# Patient Record
Sex: MALE | Race: WHITE | NOT HISPANIC OR LATINO | Employment: UNEMPLOYED | URBAN - METROPOLITAN AREA
[De-identification: names, ages, dates, MRNs, and addresses within clinical notes are randomized per-mention and may not be internally consistent; named-entity substitution may affect disease eponyms.]

---

## 2017-05-30 ENCOUNTER — HISTORICAL (OUTPATIENT)
Dept: ADMINISTRATIVE | Facility: HOSPITAL | Age: 32
End: 2017-05-30

## 2017-05-30 ENCOUNTER — HISTORICAL (OUTPATIENT)
Dept: RADIOLOGY | Facility: HOSPITAL | Age: 32
End: 2017-05-30

## 2017-05-30 LAB
ABS NEUT (OLG): 4.73 X10(3)/MCL (ref 2.1–9.2)
ABS NEUT (OLG): 4.77 X10(3)/MCL (ref 2.1–9.2)
ALBUMIN SERPL-MCNC: 4.4 GM/DL (ref 3.4–5)
ALBUMIN/GLOB SERPL: 1 RATIO (ref 1–2)
ALP SERPL-CCNC: 79 UNIT/L (ref 20–120)
ALT SERPL-CCNC: 28 UNIT/L
AST SERPL-CCNC: 26 UNIT/L
BASOPHILS # BLD AUTO: 0.08 X10(3)/MCL
BASOPHILS # BLD AUTO: 0.08 X10(3)/MCL
BASOPHILS NFR BLD AUTO: 1 % (ref 0–1)
BASOPHILS NFR BLD AUTO: 1 % (ref 0–1)
BILIRUB SERPL-MCNC: 0.9 MG/DL
BILIRUBIN DIRECT+TOT PNL SERPL-MCNC: <0.1 MG/DL
BILIRUBIN DIRECT+TOT PNL SERPL-MCNC: >0.8 MG/DL
BUN SERPL-MCNC: 12 MG/DL (ref 7–25)
CALCIUM SERPL-MCNC: 9.3 MG/DL (ref 8.4–10.3)
CD3+CD4+ CELLS # SPEC: 1020 UNIT/L (ref 589–1505)
CD3+CD4+ CELLS NFR BLD: 38.4 % (ref 31–59)
CHLORIDE SERPL-SCNC: 104 MMOL/L (ref 96–110)
CO2 SERPL-SCNC: 29 MMOL/L (ref 24–32)
CREAT SERPL-MCNC: 1.19 MG/DL (ref 0.7–1.4)
EOSINOPHIL # BLD AUTO: 0.24 X10(3)/MCL
EOSINOPHIL # BLD AUTO: 0.27 10*3/UL
EOSINOPHIL NFR BLD AUTO: 3 % (ref 0–5)
EOSINOPHIL NFR BLD AUTO: 3 % (ref 0–5)
ERYTHROCYTE [DISTWIDTH] IN BLOOD BY AUTOMATED COUNT: 12.6 % (ref 11.5–14.5)
ERYTHROCYTE [DISTWIDTH] IN BLOOD BY AUTOMATED COUNT: 12.7 % (ref 11.5–14.5)
GLOBULIN SER-MCNC: 3.2 GM/ML (ref 2.3–3.5)
GLUCOSE SERPL-MCNC: 116 MG/DL (ref 65–99)
HCT VFR BLD AUTO: 42.2 % (ref 40–51)
HCT VFR BLD AUTO: 42.6 % (ref 40–51)
HGB BLD-MCNC: 14.7 GM/DL (ref 13.5–17.5)
HGB BLD-MCNC: 14.8 GM/DL (ref 13.5–17.5)
IMM GRANULOCYTES # BLD AUTO: 0.02 10*3/UL
IMM GRANULOCYTES # BLD AUTO: 0.02 10*3/UL
IMM GRANULOCYTES NFR BLD AUTO: 0 %
IMM GRANULOCYTES NFR BLD AUTO: 0 %
LYMPHOCYTES # BLD AUTO: 2.59 X10(3)/MCL
LYMPHOCYTES # BLD AUTO: 2.61 X10(3)/MCL
LYMPHOCYTES # BLD AUTO: 2656 UNIT/L (ref 1260–5520)
LYMPHOCYTES NFR BLD AUTO: 31 % (ref 15–40)
LYMPHOCYTES NFR BLD AUTO: 32 % (ref 15–40)
LYMPHOCYTES NFR LN MANUAL: 32 % (ref 28–48)
LYMPHOMA - T-CELL MARKERS SPEC-IMP: NORMAL
MCH RBC QN AUTO: 31.7 PG (ref 26–34)
MCH RBC QN AUTO: 32.5 PG (ref 26–34)
MCHC RBC AUTO-ENTMCNC: 34.5 GM/DL (ref 31–37)
MCHC RBC AUTO-ENTMCNC: 35.1 GM/DL (ref 31–37)
MCV RBC AUTO: 91.8 FL (ref 80–100)
MCV RBC AUTO: 92.7 FL (ref 80–100)
MONOCYTES # BLD AUTO: 0.57 X10(3)/MCL
MONOCYTES # BLD AUTO: 0.59 X10(3)/MCL
MONOCYTES NFR BLD AUTO: 7 % (ref 4–12)
MONOCYTES NFR BLD AUTO: 7 % (ref 4–12)
NEUTROPHILS # BLD AUTO: 4.73 X10(3)/MCL
NEUTROPHILS # BLD AUTO: 4.77 X10(3)/MCL
NEUTROPHILS NFR BLD AUTO: 57 X10(3)/MCL
NEUTROPHILS NFR BLD AUTO: 57 X10(3)/MCL
PLATELET # BLD AUTO: 189 X10(3)/MCL (ref 130–400)
PLATELET # BLD AUTO: 195 X10(3)/MCL (ref 130–400)
PMV BLD AUTO: 10.7 FL (ref 7.4–10.4)
PMV BLD AUTO: 10.7 FL (ref 7.4–10.4)
POTASSIUM SERPL-SCNC: 3.5 MMOL/L (ref 3.6–5.2)
PROT SERPL-MCNC: 7.6 GM/DL (ref 6–8)
RBC # BLD AUTO: 4.55 X10(6)/MCL (ref 4.5–5.9)
RBC # BLD AUTO: 4.64 X10(6)/MCL (ref 4.5–5.9)
RPR SER QL: REACTIVE
SODIUM SERPL-SCNC: 137 MMOL/L (ref 135–146)
WBC # BLD AUTO: 8300 /MM3 (ref 4500–11500)
WBC # SPEC AUTO: 8.3 X10(3)/MCL (ref 4.5–11)
WBC # SPEC AUTO: 8.3 X10(3)/MCL (ref 4.5–11)

## 2017-06-06 ENCOUNTER — HISTORICAL (OUTPATIENT)
Dept: CARDIOLOGY | Facility: HOSPITAL | Age: 32
End: 2017-06-06

## 2017-09-15 ENCOUNTER — HISTORICAL (OUTPATIENT)
Dept: INTERNAL MEDICINE | Facility: CLINIC | Age: 32
End: 2017-09-15

## 2017-09-15 LAB
ABS NEUT (OLG): 5.09 X10(3)/MCL (ref 2.1–9.2)
ABS NEUT (OLG): 5.16 X10(3)/MCL (ref 2.1–9.2)
ALBUMIN SERPL-MCNC: 4.2 GM/DL (ref 3.4–5)
ALBUMIN/GLOB SERPL: 1 RATIO (ref 1–2)
ALP SERPL-CCNC: 73 UNIT/L (ref 45–117)
ALT SERPL-CCNC: 20 UNIT/L (ref 12–78)
APPEARANCE, UA: CLEAR
AST SERPL-CCNC: 14 UNIT/L (ref 15–37)
BACTERIA #/AREA URNS AUTO: ABNORMAL /[HPF]
BASOPHILS # BLD AUTO: 0.08 X10(3)/MCL
BASOPHILS # BLD AUTO: 0.08 X10(3)/MCL
BASOPHILS NFR BLD AUTO: 1 % (ref 0–1)
BASOPHILS NFR BLD AUTO: 1 % (ref 0–1)
BILIRUB SERPL-MCNC: 0.6 MG/DL (ref 0.2–1)
BILIRUB UR QL STRIP: NEGATIVE
BILIRUBIN DIRECT+TOT PNL SERPL-MCNC: 0.2 MG/DL
BILIRUBIN DIRECT+TOT PNL SERPL-MCNC: 0.4 MG/DL
BUN SERPL-MCNC: 6 MG/DL (ref 7–18)
CALCIUM SERPL-MCNC: 9.3 MG/DL (ref 8.5–10.1)
CD3+CD4+ CELLS # SPEC: NORMAL UNIT/L (ref 589–1505)
CD3+CD4+ CELLS NFR BLD: 36 % (ref 31–59)
CHLORIDE SERPL-SCNC: 102 MMOL/L (ref 98–107)
CHOLEST SERPL-MCNC: 145 MG/DL
CHOLEST/HDLC SERPL: 3.7 {RATIO} (ref 0–5)
CO2 SERPL-SCNC: 26 MMOL/L (ref 21–32)
COLOR UR: ABNORMAL
CREAT SERPL-MCNC: 1 MG/DL (ref 0.6–1.3)
DEPRECATED CALCIDIOL+CALCIFEROL SERPL-MC: 34.8 NG/ML (ref 30–80)
EOSINOPHIL # BLD AUTO: 0.16 10*3/UL
EOSINOPHIL # BLD AUTO: 0.18 X10(3)/MCL
EOSINOPHIL NFR BLD AUTO: 2 % (ref 0–5)
EOSINOPHIL NFR BLD AUTO: 2 % (ref 0–5)
ERYTHROCYTE [DISTWIDTH] IN BLOOD BY AUTOMATED COUNT: 11.7 % (ref 11.5–14.5)
ERYTHROCYTE [DISTWIDTH] IN BLOOD BY AUTOMATED COUNT: 11.7 % (ref 11.5–14.5)
GLOBULIN SER-MCNC: 3.7 GM/ML (ref 2.3–3.5)
GLUCOSE (UA): NORMAL
GLUCOSE SERPL-MCNC: 90 MG/DL (ref 74–106)
HCT VFR BLD AUTO: 42.6 % (ref 40–51)
HCT VFR BLD AUTO: 43 % (ref 40–51)
HDLC SERPL-MCNC: 39 MG/DL
HGB BLD-MCNC: 15.2 GM/DL (ref 13.5–17.5)
HGB BLD-MCNC: 15.2 GM/DL (ref 13.5–17.5)
HGB UR QL STRIP: NEGATIVE
HYALINE CASTS #/AREA URNS LPF: ABNORMAL /[LPF]
IMM GRANULOCYTES # BLD AUTO: 0.01 10*3/UL
IMM GRANULOCYTES # BLD AUTO: 0.03 10*3/UL
IMM GRANULOCYTES NFR BLD AUTO: 0 %
IMM GRANULOCYTES NFR BLD AUTO: 0 %
KETONES UR QL STRIP: NEGATIVE
LDLC SERPL CALC-MCNC: 55 MG/DL (ref 0–130)
LEUKOCYTE ESTERASE UR QL STRIP: NEGATIVE
LYMPHOCYTES # BLD AUTO: 2.56 X10(3)/MCL
LYMPHOCYTES # BLD AUTO: 2.58 X10(3)/MCL
LYMPHOCYTES # BLD AUTO: 2550 UNIT/L (ref 1260–5520)
LYMPHOCYTES NFR BLD AUTO: 30 % (ref 15–40)
LYMPHOCYTES NFR BLD AUTO: 30 % (ref 15–40)
LYMPHOCYTES NFR LN MANUAL: 30 % (ref 28–48)
LYMPHOMA - T-CELL MARKERS SPEC-IMP: NORMAL
MCH RBC QN AUTO: 32.9 PG (ref 26–34)
MCH RBC QN AUTO: 33.5 PG (ref 26–34)
MCHC RBC AUTO-ENTMCNC: 35.3 GM/DL (ref 31–37)
MCHC RBC AUTO-ENTMCNC: 35.7 GM/DL (ref 31–37)
MCV RBC AUTO: 93.1 FL (ref 80–100)
MCV RBC AUTO: 93.8 FL (ref 80–100)
MONOCYTES # BLD AUTO: 0.49 X10(3)/MCL
MONOCYTES # BLD AUTO: 0.54 X10(3)/MCL
MONOCYTES NFR BLD AUTO: 6 % (ref 4–12)
MONOCYTES NFR BLD AUTO: 6 % (ref 4–12)
NEUTROPHILS # BLD AUTO: 5.09 X10(3)/MCL
NEUTROPHILS # BLD AUTO: 5.16 X10(3)/MCL
NEUTROPHILS NFR BLD AUTO: 60 X10(3)/MCL
NEUTROPHILS NFR BLD AUTO: 61 X10(3)/MCL
NITRITE UR QL STRIP: NEGATIVE
PH UR STRIP: 6.5 [PH] (ref 4.5–8)
PLATELET # BLD AUTO: 228 X10(3)/MCL (ref 130–400)
PLATELET # BLD AUTO: 231 X10(3)/MCL (ref 130–400)
PMV BLD AUTO: 10.6 FL (ref 7.4–10.4)
PMV BLD AUTO: 10.8 FL (ref 7.4–10.4)
POTASSIUM SERPL-SCNC: 4.3 MMOL/L (ref 3.5–5.1)
PROT SERPL-MCNC: 7.9 GM/DL (ref 6.4–8.2)
PROT UR QL STRIP: NEGATIVE
RBC # BLD AUTO: 4.54 X10(6)/MCL (ref 4.5–5.9)
RBC # BLD AUTO: 4.62 X10(6)/MCL (ref 4.5–5.9)
RBC #/AREA URNS AUTO: ABNORMAL /[HPF]
RPR SER QL: REACTIVE
SODIUM SERPL-SCNC: 136 MMOL/L (ref 136–145)
SP GR UR STRIP: 1 (ref 1–1.03)
SQUAMOUS #/AREA URNS LPF: <1 /[LPF]
T PALLIDUM AB SER QL: REACTIVE
TRIGL SERPL-MCNC: 256 MG/DL
TSH SERPL-ACNC: 0.92 MIU/L (ref 0.36–3.74)
UROBILINOGEN UR STRIP-ACNC: NORMAL
VLDLC SERPL CALC-MCNC: 51 MG/DL
WBC # BLD AUTO: 8500 /MM3 (ref 4500–11500)
WBC # SPEC AUTO: 8.5 X10(3)/MCL (ref 4.5–11)
WBC # SPEC AUTO: 8.5 X10(3)/MCL (ref 4.5–11)
WBC #/AREA URNS AUTO: ABNORMAL /HPF

## 2017-09-25 ENCOUNTER — HOSPITAL ENCOUNTER (OUTPATIENT)
Dept: MEDSURG UNIT | Facility: HOSPITAL | Age: 32
End: 2017-09-26
Attending: INTERNAL MEDICINE | Admitting: INTERNAL MEDICINE

## 2017-09-25 LAB
ABS NEUT (OLG): 6.06 X10(3)/MCL (ref 2.1–9.2)
ALBUMIN SERPL-MCNC: 4.6 GM/DL (ref 3.4–5)
ALBUMIN/GLOB SERPL: 1.2 {RATIO}
ALP SERPL-CCNC: 86 UNIT/L (ref 50–136)
ALT SERPL-CCNC: 27 UNIT/L (ref 12–78)
AMPHET UR QL SCN: NORMAL
APPEARANCE, UA: CLEAR
AST SERPL-CCNC: 17 UNIT/L (ref 15–37)
BACTERIA SPEC CULT: NORMAL /HPF
BARBITURATE SCN PRESENT UR: NORMAL
BASOPHILS # BLD AUTO: 0 X10(3)/MCL (ref 0–0.2)
BASOPHILS NFR BLD AUTO: 0 %
BENZODIAZ UR QL SCN: NORMAL
BILIRUB SERPL-MCNC: 0.8 MG/DL (ref 0.2–1)
BILIRUB UR QL STRIP: NEGATIVE
BILIRUBIN DIRECT+TOT PNL SERPL-MCNC: 0.1 MG/DL (ref 0–0.2)
BILIRUBIN DIRECT+TOT PNL SERPL-MCNC: 0.7 MG/DL (ref 0–0.8)
BUN SERPL-MCNC: 8 MG/DL (ref 7–18)
CALCIUM SERPL-MCNC: 9.7 MG/DL (ref 8.5–10.1)
CANNABINOIDS UR QL SCN: NORMAL
CHLORIDE SERPL-SCNC: 103 MMOL/L (ref 98–107)
CO2 SERPL-SCNC: 24 MMOL/L (ref 21–32)
COCAINE UR QL SCN: NORMAL
COLOR UR: YELLOW
CREAT SERPL-MCNC: 1.1 MG/DL (ref 0.7–1.3)
EOSINOPHIL # BLD AUTO: 0.1 X10(3)/MCL (ref 0–0.9)
EOSINOPHIL NFR BLD AUTO: 1 %
ERYTHROCYTE [DISTWIDTH] IN BLOOD BY AUTOMATED COUNT: 11.9 % (ref 11.5–17)
ETHANOL SERPL-MCNC: 4 MG/DL (ref 0–3)
GLOBULIN SER-MCNC: 3.9 GM/DL (ref 2.4–3.5)
GLUCOSE (UA): NEGATIVE
GLUCOSE CSF-MCNC: 62 MG/DL (ref 40–70)
GLUCOSE SERPL-MCNC: 98 MG/DL (ref 74–106)
GRAM STN SPEC: NORMAL
HCT VFR BLD AUTO: 45.8 % (ref 42–52)
HGB BLD-MCNC: 16.2 GM/DL (ref 14–18)
HGB UR QL STRIP: NEGATIVE
KETONES UR QL STRIP: NEGATIVE
LEUKOCYTE ESTERASE UR QL STRIP: NEGATIVE
LYMPHOCYTES # BLD AUTO: 2.9 X10(3)/MCL (ref 0.6–4.6)
LYMPHOCYTES NFR BLD AUTO: 31 %
MCH RBC QN AUTO: 33.9 PG (ref 27–31)
MCHC RBC AUTO-ENTMCNC: 35.4 GM/DL (ref 33–36)
MCV RBC AUTO: 95.8 FL (ref 80–94)
MONOCYTES # BLD AUTO: 0.5 X10(3)/MCL (ref 0.1–1.3)
MONOCYTES NFR BLD AUTO: 5 %
NEUTROPHILS # BLD AUTO: 6.06 X10(3)/MCL (ref 1.4–7.9)
NEUTROPHILS NFR BLD AUTO: 63 %
NITRITE UR QL STRIP: NEGATIVE
OPIATES UR QL SCN: NORMAL
PCP UR QL: NORMAL
PH UR STRIP.AUTO: 7 [PH] (ref 5–7.5)
PH UR STRIP: 7 [PH] (ref 5–9)
PLATELET # BLD AUTO: 206 X10(3)/MCL (ref 130–400)
PMV BLD AUTO: 10.8 FL (ref 9.4–12.4)
POC TROPONIN: 0.02 NG/ML (ref 0–0.08)
POTASSIUM SERPL-SCNC: 4.6 MMOL/L (ref 3.5–5.1)
PROT CSF-MCNC: 63.5 MG/DL (ref 15–45)
PROT SERPL-MCNC: 8.5 GM/DL (ref 6.4–8.2)
PROT UR QL STRIP: NEGATIVE
RBC # BLD AUTO: 4.78 X10(6)/MCL (ref 4.7–6.1)
RBC #/AREA URNS HPF: NORMAL /[HPF]
SODIUM SERPL-SCNC: 134 MMOL/L (ref 136–145)
SP GR FLD REFRACTOMETRY: 1 (ref 1–1.03)
SP GR UR STRIP: 1 (ref 1–1.03)
SQUAMOUS EPITHELIAL, UA: NORMAL
UA WBC MAN: NORMAL
UROBILINOGEN UR STRIP-ACNC: 0.2
WBC # SPEC AUTO: 9.6 X10(3)/MCL (ref 4.5–11.5)

## 2017-09-30 LAB — FINAL CULTURE: NORMAL

## 2017-10-23 LAB — FINAL CULTURE: NORMAL

## 2018-01-08 ENCOUNTER — HISTORICAL (OUTPATIENT)
Dept: ADMINISTRATIVE | Facility: HOSPITAL | Age: 33
End: 2018-01-08

## 2018-01-08 LAB
ABS NEUT (OLG): 3.65 X10(3)/MCL (ref 2.1–9.2)
ABS NEUT (OLG): 3.7 X10(3)/MCL (ref 2.1–9.2)
ALBUMIN SERPL-MCNC: 4.6 GM/DL (ref 3.4–5)
ALBUMIN/GLOB SERPL: 1 RATIO (ref 1–2)
ALP SERPL-CCNC: 94 UNIT/L (ref 45–117)
ALT SERPL-CCNC: 32 UNIT/L (ref 12–78)
AST SERPL-CCNC: 17 UNIT/L (ref 15–37)
BASOPHILS # BLD AUTO: 0.03 X10(3)/MCL
BASOPHILS # BLD AUTO: 0.04 X10(3)/MCL
BASOPHILS NFR BLD AUTO: 0 % (ref 0–1)
BASOPHILS NFR BLD AUTO: 1 % (ref 0–1)
BILIRUB SERPL-MCNC: 0.5 MG/DL (ref 0.2–1)
BILIRUBIN DIRECT+TOT PNL SERPL-MCNC: 0.1 MG/DL
BILIRUBIN DIRECT+TOT PNL SERPL-MCNC: 0.4 MG/DL
BUN SERPL-MCNC: 9 MG/DL (ref 7–18)
CALCIUM SERPL-MCNC: 9.6 MG/DL (ref 8.5–10.1)
CD3+CD4+ CELLS # SPEC: 834 UNIT/L (ref 589–1505)
CD3+CD4+ CELLS NFR BLD: 38.3 % (ref 31–59)
CHLORIDE SERPL-SCNC: 98 MMOL/L (ref 98–107)
CHOLEST SERPL-MCNC: 153 MG/DL
CHOLEST/HDLC SERPL: 4.2 {RATIO} (ref 0–5)
CO2 SERPL-SCNC: 28 MMOL/L (ref 21–32)
CREAT SERPL-MCNC: 1 MG/DL (ref 0.6–1.3)
EOSINOPHIL # BLD AUTO: 0.14 10*3/UL
EOSINOPHIL # BLD AUTO: 0.17 X10(3)/MCL
EOSINOPHIL NFR BLD AUTO: 2 % (ref 0–5)
EOSINOPHIL NFR BLD AUTO: 3 % (ref 0–5)
ERYTHROCYTE [DISTWIDTH] IN BLOOD BY AUTOMATED COUNT: 11.9 % (ref 11.5–14.5)
ERYTHROCYTE [DISTWIDTH] IN BLOOD BY AUTOMATED COUNT: 11.9 % (ref 11.5–14.5)
GLOBULIN SER-MCNC: 4.4 GM/ML (ref 2.3–3.5)
GLUCOSE SERPL-MCNC: 96 MG/DL (ref 74–106)
HCT VFR BLD AUTO: 46.4 % (ref 40–51)
HCT VFR BLD AUTO: 47 % (ref 40–51)
HDLC SERPL-MCNC: 36 MG/DL
HGB BLD-MCNC: 16.1 GM/DL (ref 13.5–17.5)
HGB BLD-MCNC: 16.1 GM/DL (ref 13.5–17.5)
IMM GRANULOCYTES # BLD AUTO: 0.01 10*3/UL
IMM GRANULOCYTES # BLD AUTO: 0.03 10*3/UL
IMM GRANULOCYTES NFR BLD AUTO: 0 %
IMM GRANULOCYTES NFR BLD AUTO: 0 %
LDLC SERPL CALC-MCNC: 73 MG/DL (ref 0–130)
LYMPHOCYTES # BLD AUTO: 2.19 X10(3)/MCL
LYMPHOCYTES # BLD AUTO: 2.42 X10(3)/MCL
LYMPHOCYTES # BLD AUTO: 2178 UNIT/L (ref 1260–5520)
LYMPHOCYTES NFR BLD AUTO: 33 % (ref 15–40)
LYMPHOCYTES NFR BLD AUTO: 36 % (ref 15–40)
LYMPHOCYTES NFR LN MANUAL: 33 % (ref 28–48)
LYMPHOMA - T-CELL MARKERS SPEC-IMP: NORMAL
MCH RBC QN AUTO: 32.7 PG (ref 26–34)
MCH RBC QN AUTO: 33.2 PG (ref 26–34)
MCHC RBC AUTO-ENTMCNC: 34.3 GM/DL (ref 31–37)
MCHC RBC AUTO-ENTMCNC: 34.7 GM/DL (ref 31–37)
MCV RBC AUTO: 95.5 FL (ref 80–100)
MCV RBC AUTO: 95.7 FL (ref 80–100)
MONOCYTES # BLD AUTO: 0.49 X10(3)/MCL
MONOCYTES # BLD AUTO: 0.5 X10(3)/MCL
MONOCYTES NFR BLD AUTO: 7 % (ref 4–12)
MONOCYTES NFR BLD AUTO: 7 % (ref 4–12)
NEUTROPHILS # BLD AUTO: 3.65 X10(3)/MCL
NEUTROPHILS # BLD AUTO: 3.7 X10(3)/MCL
NEUTROPHILS NFR BLD AUTO: 54 X10(3)/MCL
NEUTROPHILS NFR BLD AUTO: 56 X10(3)/MCL
PLATELET # BLD AUTO: 196 X10(3)/MCL (ref 130–400)
PLATELET # BLD AUTO: 198 X10(3)/MCL (ref 130–400)
PMV BLD AUTO: 11 FL (ref 7.4–10.4)
PMV BLD AUTO: 11.1 FL (ref 7.4–10.4)
POTASSIUM SERPL-SCNC: 4.7 MMOL/L (ref 3.5–5.1)
PROT SERPL-MCNC: 9 GM/DL (ref 6.4–8.2)
RBC # BLD AUTO: 4.85 X10(6)/MCL (ref 4.5–5.9)
RBC # BLD AUTO: 4.92 X10(6)/MCL (ref 4.5–5.9)
RPR SER QL: REACTIVE
SODIUM SERPL-SCNC: 134 MMOL/L (ref 136–145)
T PALLIDUM AB SER QL: REACTIVE
TRIGL SERPL-MCNC: 218 MG/DL
VLDLC SERPL CALC-MCNC: 44 MG/DL
WBC # BLD AUTO: 6600 /MM3 (ref 4500–11500)
WBC # SPEC AUTO: 6.6 X10(3)/MCL (ref 4.5–11)
WBC # SPEC AUTO: 6.8 X10(3)/MCL (ref 4.5–11)

## 2018-04-09 ENCOUNTER — HISTORICAL (OUTPATIENT)
Dept: ADMINISTRATIVE | Facility: HOSPITAL | Age: 33
End: 2018-04-09

## 2018-04-09 LAB
ABS NEUT (OLG): 5.39 X10(3)/MCL (ref 2.1–9.2)
ALBUMIN SERPL-MCNC: 4.6 GM/DL (ref 3.4–5)
ALBUMIN/GLOB SERPL: 1 RATIO (ref 1–2)
ALP SERPL-CCNC: 99 UNIT/L (ref 45–117)
ALT SERPL-CCNC: 21 UNIT/L (ref 12–78)
AST SERPL-CCNC: 16 UNIT/L (ref 15–37)
BASOPHILS # BLD AUTO: 0.08 X10(3)/MCL
BASOPHILS NFR BLD AUTO: 1 %
BILIRUB SERPL-MCNC: 0.6 MG/DL (ref 0.2–1)
BILIRUBIN DIRECT+TOT PNL SERPL-MCNC: 0.2 MG/DL
BILIRUBIN DIRECT+TOT PNL SERPL-MCNC: 0.4 MG/DL
BUN SERPL-MCNC: 11 MG/DL (ref 7–18)
CALCIUM SERPL-MCNC: 9.7 MG/DL (ref 8.5–10.1)
CHLORIDE SERPL-SCNC: 100 MMOL/L (ref 98–107)
CO2 SERPL-SCNC: 27 MMOL/L (ref 21–32)
CREAT SERPL-MCNC: 1.2 MG/DL (ref 0.6–1.3)
EOSINOPHIL # BLD AUTO: 0.11 10*3/UL
EOSINOPHIL NFR BLD AUTO: 1 %
ERYTHROCYTE [DISTWIDTH] IN BLOOD BY AUTOMATED COUNT: 11.9 % (ref 11.5–14.5)
EST. AVERAGE GLUCOSE BLD GHB EST-MCNC: 103 MG/DL
GLOBULIN SER-MCNC: 4.5 GM/ML (ref 2.3–3.5)
GLUCOSE SERPL-MCNC: 87 MG/DL (ref 74–106)
HBA1C MFR BLD: 5.2 % (ref 4.2–6.3)
HCT VFR BLD AUTO: 50.4 % (ref 40–51)
HGB BLD-MCNC: 17.4 GM/DL (ref 13.5–17.5)
IMM GRANULOCYTES # BLD AUTO: 0.02 10*3/UL
IMM GRANULOCYTES NFR BLD AUTO: 0 %
LYMPHOCYTES # BLD AUTO: 2.07 X10(3)/MCL
LYMPHOCYTES NFR BLD AUTO: 25 % (ref 13–40)
MCH RBC QN AUTO: 32.2 PG (ref 26–34)
MCHC RBC AUTO-ENTMCNC: 34.5 GM/DL (ref 31–37)
MCV RBC AUTO: 93.3 FL (ref 80–100)
MONOCYTES # BLD AUTO: 0.52 X10(3)/MCL
MONOCYTES NFR BLD AUTO: 6 % (ref 4–12)
NEUTROPHILS # BLD AUTO: 5.39 X10(3)/MCL
NEUTROPHILS NFR BLD AUTO: 66 X10(3)/MCL
PLATELET # BLD AUTO: 267 X10(3)/MCL (ref 130–400)
PMV BLD AUTO: 11.2 FL (ref 7.4–10.4)
POTASSIUM SERPL-SCNC: 4.7 MMOL/L (ref 3.5–5.1)
PROT SERPL-MCNC: 9.1 GM/DL (ref 6.4–8.2)
RBC # BLD AUTO: 5.4 X10(6)/MCL (ref 4.5–5.9)
RPR SER QL: REACTIVE
SODIUM SERPL-SCNC: 134 MMOL/L (ref 136–145)
T PALLIDUM AB SER QL: REACTIVE
WBC # SPEC AUTO: 8.2 X10(3)/MCL (ref 4.5–11)

## 2018-05-01 ENCOUNTER — TELEPHONE (OUTPATIENT)
Dept: ENDOCRINOLOGY | Facility: CLINIC | Age: 33
End: 2018-05-01

## 2018-05-01 NOTE — TELEPHONE ENCOUNTER
----- Message from Henna Violet sent at 5/1/2018  9:18 AM CDT -----  Contact: Ezra   Tel:   [phone]   call anytime   Patient Requesting Sooner Appointment.     Reason: (I.e. Caller declined first available, appointment listed below. Caller will not accept being placed on the waitlist and is requesting a message be sent to doctor, etc.)  / transgender /hormone replacement therapy    Name of Caller: Ezra Kennedy  When is the first available appointment? No access  Symptoms: Transgender appt. Requested.  Communication Preference ([facility] response to Pt. (or) Call Back # and timeframe): [phone]   Additional Information: will need appt. Info in advance to set up transportation.

## 2018-05-01 NOTE — TELEPHONE ENCOUNTER
Spoke with patient. Has Medicaid and cannot afford the deposit that [facility] requires.  Gave information to patient for [facility] and [facility] and provided contact numbers to schedule a an appointment

## 2018-08-24 ENCOUNTER — HISTORICAL (OUTPATIENT)
Dept: ADMINISTRATIVE | Facility: HOSPITAL | Age: 33
End: 2018-08-24

## 2018-08-24 LAB
ABS NEUT (OLG): 3.32 X10(3)/MCL (ref 2.1–9.2)
ALBUMIN SERPL-MCNC: 4.2 GM/DL (ref 3.4–5)
ALBUMIN/GLOB SERPL: 1 RATIO (ref 1–2)
ALP SERPL-CCNC: 104 UNIT/L (ref 45–117)
ALT SERPL-CCNC: 21 UNIT/L (ref 12–78)
AST SERPL-CCNC: 18 UNIT/L (ref 15–37)
BASOPHILS # BLD AUTO: 0.08 X10(3)/MCL
BASOPHILS NFR BLD AUTO: 1 %
BILIRUB SERPL-MCNC: 0.6 MG/DL (ref 0.2–1)
BILIRUBIN DIRECT+TOT PNL SERPL-MCNC: 0.1 MG/DL
BILIRUBIN DIRECT+TOT PNL SERPL-MCNC: 0.5 MG/DL
BUN SERPL-MCNC: 3 MG/DL (ref 7–18)
CALCIUM SERPL-MCNC: 9.2 MG/DL (ref 8.5–10.1)
CD3+CD4+ CELLS # SPEC: 714 UNIT/L (ref 589–1505)
CD3+CD4+ CELLS NFR BLD: 32.9 % (ref 31–59)
CHLORIDE SERPL-SCNC: 104 MMOL/L (ref 98–107)
CO2 SERPL-SCNC: 32 MMOL/L (ref 21–32)
CREAT SERPL-MCNC: 1 MG/DL (ref 0.6–1.3)
EOSINOPHIL # BLD AUTO: 0.22 X10(3)/MCL
EOSINOPHIL NFR BLD AUTO: 4 %
ERYTHROCYTE [DISTWIDTH] IN BLOOD BY AUTOMATED COUNT: 11.7 % (ref 11.5–14.5)
GLOBULIN SER-MCNC: 4 GM/ML (ref 2.3–3.5)
GLUCOSE SERPL-MCNC: 94 MG/DL (ref 74–106)
HCT VFR BLD AUTO: 47.2 % (ref 40–51)
HGB BLD-MCNC: 15.4 GM/DL (ref 13.5–17.5)
IMM GRANULOCYTES # BLD AUTO: 0.01 10*3/UL
IMM GRANULOCYTES NFR BLD AUTO: 0 %
LYMPHOCYTES # BLD AUTO: 2.16 X10(3)/MCL
LYMPHOCYTES # BLD AUTO: 2170 UNIT/L (ref 1260–5520)
LYMPHOCYTES NFR BLD AUTO: 35 % (ref 13–40)
LYMPHOCYTES NFR LN MANUAL: 35 % (ref 28–48)
LYMPHOMA - T-CELL MARKERS SPEC-IMP: NORMAL
MCH RBC QN AUTO: 30.8 PG (ref 26–34)
MCHC RBC AUTO-ENTMCNC: 32.6 GM/DL (ref 31–37)
MCV RBC AUTO: 94.4 FL (ref 80–100)
MONOCYTES # BLD AUTO: 0.4 X10(3)/MCL
MONOCYTES NFR BLD AUTO: 6 % (ref 4–12)
NEUTROPHILS # BLD AUTO: 3.32 X10(3)/MCL
NEUTROPHILS NFR BLD AUTO: 54 X10(3)/MCL
PLATELET # BLD AUTO: 172 X10(3)/MCL (ref 130–400)
PMV BLD AUTO: 11.5 FL (ref 7.4–10.4)
POTASSIUM SERPL-SCNC: 4.7 MMOL/L (ref 3.5–5.1)
PROT SERPL-MCNC: 8.2 GM/DL (ref 6.4–8.2)
RBC # BLD AUTO: 5 X10(6)/MCL (ref 4.5–5.9)
RPR SER QL: REACTIVE
SODIUM SERPL-SCNC: 140 MMOL/L (ref 136–145)
T PALLIDUM AB SER QL: REACTIVE
WBC # BLD AUTO: 6200 /MM3 (ref 4500–11500)
WBC # SPEC AUTO: 6.2 X10(3)/MCL (ref 4.5–11)

## 2018-12-17 ENCOUNTER — HISTORICAL (OUTPATIENT)
Dept: ADMINISTRATIVE | Facility: HOSPITAL | Age: 33
End: 2018-12-17

## 2018-12-17 LAB
APPEARANCE, UA: CLEAR
BACTERIA #/AREA URNS AUTO: ABNORMAL /[HPF]
BILIRUB UR QL STRIP: NEGATIVE
COLOR UR: YELLOW
GLUCOSE (UA): NORMAL
HGB UR QL STRIP: NEGATIVE
HYALINE CASTS #/AREA URNS LPF: ABNORMAL /[LPF]
KETONES UR QL STRIP: NEGATIVE
LEUKOCYTE ESTERASE UR QL STRIP: NEGATIVE
NITRITE UR QL STRIP: NEGATIVE
PH UR STRIP: 7 [PH] (ref 4.5–8)
PROT UR QL STRIP: NEGATIVE
RBC #/AREA URNS AUTO: ABNORMAL /[HPF]
SP GR UR STRIP: 1 (ref 1–1.03)
SQUAMOUS #/AREA URNS LPF: ABNORMAL /[LPF]
UROBILINOGEN UR STRIP-ACNC: NORMAL
WBC #/AREA URNS AUTO: ABNORMAL /HPF

## 2019-04-22 ENCOUNTER — HISTORICAL (OUTPATIENT)
Dept: ADMINISTRATIVE | Facility: HOSPITAL | Age: 34
End: 2019-04-22

## 2019-04-22 LAB
ABS NEUT (OLG): 4.26 X10(3)/MCL (ref 2.1–9.2)
ALBUMIN SERPL-MCNC: 4.4 GM/DL (ref 3.4–5)
ALBUMIN/GLOB SERPL: 1.2 RATIO (ref 1.1–2)
ALP SERPL-CCNC: 87 UNIT/L (ref 45–117)
ALT SERPL-CCNC: 27 UNIT/L (ref 12–78)
APPEARANCE, UA: CLEAR
AST SERPL-CCNC: 19 UNIT/L (ref 15–37)
BACTERIA #/AREA URNS AUTO: ABNORMAL /[HPF]
BASOPHILS # BLD AUTO: 0.07 X10(3)/MCL
BASOPHILS NFR BLD AUTO: 1 %
BILIRUB SERPL-MCNC: 1.2 MG/DL (ref 0.2–1)
BILIRUB UR QL STRIP: NEGATIVE
BILIRUBIN DIRECT+TOT PNL SERPL-MCNC: 0.2 MG/DL
BILIRUBIN DIRECT+TOT PNL SERPL-MCNC: 1 MG/DL
BUN SERPL-MCNC: 6 MG/DL (ref 7–18)
CALCIUM SERPL-MCNC: 9.2 MG/DL (ref 8.5–10.1)
CD3+CD4+ CELLS # SPEC: 647 UNIT/L (ref 589–1505)
CD3+CD4+ CELLS NFR BLD: 34 % (ref 31–59)
CHLORIDE SERPL-SCNC: 103 MMOL/L (ref 98–107)
CHOLEST SERPL-MCNC: 144 MG/DL
CHOLEST/HDLC SERPL: 4.8 {RATIO} (ref 0–5)
CO2 SERPL-SCNC: 32 MMOL/L (ref 21–32)
COLOR UR: NORMAL
CREAT SERPL-MCNC: 1.1 MG/DL (ref 0.6–1.3)
DEPRECATED CALCIDIOL+CALCIFEROL SERPL-MC: 22.46 NG/ML (ref 30–80)
EOSINOPHIL # BLD AUTO: 0.15 X10(3)/MCL
EOSINOPHIL NFR BLD AUTO: 2 %
ERYTHROCYTE [DISTWIDTH] IN BLOOD BY AUTOMATED COUNT: 11.9 % (ref 11.5–14.5)
EST. AVERAGE GLUCOSE BLD GHB EST-MCNC: 100 MG/DL
GLOBULIN SER-MCNC: 3.8 GM/ML (ref 2.3–3.5)
GLUCOSE (UA): NORMAL
GLUCOSE SERPL-MCNC: 95 MG/DL (ref 74–106)
HBA1C MFR BLD: 5.1 % (ref 4.2–6.3)
HCT VFR BLD AUTO: 48.2 % (ref 40–51)
HCV AB SERPL QL IA: NONREACTIVE
HDLC SERPL-MCNC: 30 MG/DL
HGB BLD-MCNC: 16.2 GM/DL (ref 13.5–17.5)
HGB UR QL STRIP: NEGATIVE
HYALINE CASTS #/AREA URNS LPF: ABNORMAL /[LPF]
IMM GRANULOCYTES # BLD AUTO: 0.01 10*3/UL
IMM GRANULOCYTES NFR BLD AUTO: 0 %
KETONES UR QL STRIP: NEGATIVE
LDLC SERPL CALC-MCNC: 58 MG/DL (ref 0–130)
LEUKOCYTE ESTERASE UR QL STRIP: NEGATIVE
LYMPHOCYTES # BLD AUTO: 1.87 X10(3)/MCL
LYMPHOCYTES # BLD AUTO: 1904 UNIT/L (ref 1260–5520)
LYMPHOCYTES NFR BLD AUTO: 28 % (ref 13–40)
LYMPHOCYTES NFR LN MANUAL: 28 % (ref 28–48)
LYMPHOMA - T-CELL MARKERS SPEC-IMP: NORMAL
MCH RBC QN AUTO: 31.2 PG (ref 26–34)
MCHC RBC AUTO-ENTMCNC: 33.6 GM/DL (ref 31–37)
MCV RBC AUTO: 92.7 FL (ref 80–100)
MONOCYTES # BLD AUTO: 0.44 X10(3)/MCL
MONOCYTES NFR BLD AUTO: 6 % (ref 4–12)
NEG CONT SPOT COUNT: NORMAL
NEUTROPHILS # BLD AUTO: 4.26 X10(3)/MCL
NEUTROPHILS NFR BLD AUTO: 63 X10(3)/MCL
NITRITE UR QL STRIP: NEGATIVE
PANEL A SPOT COUNT: 0
PANEL B SPOT COUNT: 0
PH UR STRIP: 8 [PH] (ref 4.5–8)
PLATELET # BLD AUTO: 211 X10(3)/MCL (ref 130–400)
PMV BLD AUTO: 10.2 FL (ref 7.4–10.4)
POS CONT SPOT COUNT: NORMAL
POTASSIUM SERPL-SCNC: 4.5 MMOL/L (ref 3.5–5.1)
PROT SERPL-MCNC: 8.2 GM/DL (ref 6.4–8.2)
PROT UR QL STRIP: NEGATIVE
RBC # BLD AUTO: 5.2 X10(6)/MCL (ref 4.5–5.9)
RBC #/AREA URNS AUTO: ABNORMAL /[HPF]
RPR SER QL: REACTIVE
SODIUM SERPL-SCNC: 137 MMOL/L (ref 136–145)
SP GR UR STRIP: 1 (ref 1–1.03)
SQUAMOUS #/AREA URNS LPF: ABNORMAL /[LPF]
T PALLIDUM AB SER QL: REACTIVE
T-SPOT.TB: NORMAL
TRIGL SERPL-MCNC: 279 MG/DL
TSH SERPL-ACNC: 1.29 MIU/L (ref 0.36–3.74)
UROBILINOGEN UR STRIP-ACNC: NORMAL
VLDLC SERPL CALC-MCNC: 56 MG/DL
WBC # BLD AUTO: 6800 /MM3 (ref 4500–11500)
WBC # SPEC AUTO: 6.8 X10(3)/MCL (ref 4.5–11)
WBC #/AREA URNS AUTO: ABNORMAL /HPF

## 2019-07-19 ENCOUNTER — HISTORICAL (OUTPATIENT)
Dept: ADMINISTRATIVE | Facility: HOSPITAL | Age: 34
End: 2019-07-19

## 2019-07-19 LAB
ABS NEUT (OLG): 4.67 X10(3)/MCL (ref 2.1–9.2)
ABS NEUT (OLG): 4.69 X10(3)/MCL (ref 2.1–9.2)
ALBUMIN SERPL-MCNC: 4.2 GM/DL (ref 3.4–5)
ALBUMIN/GLOB SERPL: 1.1 RATIO (ref 1.1–2)
ALP SERPL-CCNC: 107 UNIT/L (ref 45–117)
ALT SERPL-CCNC: 29 UNIT/L (ref 12–78)
AMPHET UR QL SCN: NEGATIVE
AST SERPL-CCNC: 19 UNIT/L (ref 15–37)
BARBITURATE SCN PRESENT UR: NEGATIVE
BASOPHILS # BLD AUTO: 0.07 X10(3)/MCL
BASOPHILS # BLD AUTO: 0.08 X10(3)/MCL
BASOPHILS NFR BLD AUTO: 1 %
BASOPHILS NFR BLD AUTO: 1 %
BENZODIAZ UR QL SCN: NEGATIVE
BILIRUB SERPL-MCNC: 0.8 MG/DL (ref 0.2–1)
BILIRUBIN DIRECT+TOT PNL SERPL-MCNC: 0.2 MG/DL
BILIRUBIN DIRECT+TOT PNL SERPL-MCNC: 0.6 MG/DL
BUN SERPL-MCNC: 5 MG/DL (ref 7–18)
CALCIUM SERPL-MCNC: 9.6 MG/DL (ref 8.5–10.1)
CANNABINOIDS UR QL SCN: POSITIVE
CD3+CD4+ CELLS # SPEC: 545 UNIT/L (ref 589–1505)
CD3+CD4+ CELLS NFR BLD: 26.3 % (ref 31–59)
CHLORIDE SERPL-SCNC: 105 MMOL/L (ref 98–107)
CHOLEST SERPL-MCNC: 140 MG/DL
CHOLEST/HDLC SERPL: 3.9 {RATIO} (ref 0–5)
CO2 SERPL-SCNC: 32 MMOL/L (ref 21–32)
COCAINE UR QL SCN: NEGATIVE
CREAT SERPL-MCNC: 1.1 MG/DL (ref 0.6–1.3)
DEPRECATED CALCIDIOL+CALCIFEROL SERPL-MC: 22.45 NG/ML (ref 30–80)
EOSINOPHIL # BLD AUTO: 0.17 10*3/UL
EOSINOPHIL # BLD AUTO: 0.2 X10(3)/MCL
EOSINOPHIL NFR BLD AUTO: 2 %
EOSINOPHIL NFR BLD AUTO: 3 %
ERYTHROCYTE [DISTWIDTH] IN BLOOD BY AUTOMATED COUNT: 12.4 % (ref 11.5–14.5)
ERYTHROCYTE [DISTWIDTH] IN BLOOD BY AUTOMATED COUNT: 12.6 % (ref 11.5–14.5)
EST. AVERAGE GLUCOSE BLD GHB EST-MCNC: 108 MG/DL
GLOBULIN SER-MCNC: 3.9 GM/ML (ref 2.3–3.5)
GLUCOSE SERPL-MCNC: 97 MG/DL (ref 74–106)
HBA1C MFR BLD: 5.4 % (ref 4.2–6.3)
HCT VFR BLD AUTO: 49 % (ref 40–51)
HCT VFR BLD AUTO: 49.9 % (ref 40–51)
HCV AB SERPL QL IA: NONREACTIVE
HDLC SERPL-MCNC: 36 MG/DL
HGB BLD-MCNC: 16.5 GM/DL (ref 13.5–17.5)
HGB BLD-MCNC: 16.5 GM/DL (ref 13.5–17.5)
IMM GRANULOCYTES # BLD AUTO: 0.01 10*3/UL
IMM GRANULOCYTES # BLD AUTO: 0.01 10*3/UL
IMM GRANULOCYTES NFR BLD AUTO: 0 %
IMM GRANULOCYTES NFR BLD AUTO: 0 %
LDLC SERPL CALC-MCNC: 58 MG/DL (ref 0–130)
LYMPHOCYTES # BLD AUTO: 2.09 X10(3)/MCL
LYMPHOCYTES # BLD AUTO: 2.11 X10(3)/MCL
LYMPHOCYTES # BLD AUTO: 2072 UNIT/L (ref 1260–5520)
LYMPHOCYTES NFR BLD AUTO: 28 % (ref 13–40)
LYMPHOCYTES NFR BLD AUTO: 28 % (ref 13–40)
LYMPHOCYTES NFR LN MANUAL: 28 % (ref 28–48)
LYMPHOMA - T-CELL MARKERS SPEC-IMP: ABNORMAL
MCH RBC QN AUTO: 30.8 PG (ref 26–34)
MCH RBC QN AUTO: 30.8 PG (ref 26–34)
MCHC RBC AUTO-ENTMCNC: 33.1 GM/DL (ref 31–37)
MCHC RBC AUTO-ENTMCNC: 33.7 GM/DL (ref 31–37)
MCV RBC AUTO: 91.4 FL (ref 80–100)
MCV RBC AUTO: 93.1 FL (ref 80–100)
MONOCYTES # BLD AUTO: 0.4 X10(3)/MCL
MONOCYTES # BLD AUTO: 0.41 X10(3)/MCL
MONOCYTES NFR BLD AUTO: 5 % (ref 0–24)
MONOCYTES NFR BLD AUTO: 6 % (ref 0–24)
NEG CONT SPOT COUNT: NORMAL
NEUTROPHILS # BLD AUTO: 4.67 X10(3)/MCL
NEUTROPHILS # BLD AUTO: 4.69 X10(3)/MCL
NEUTROPHILS NFR BLD AUTO: 63 X10(3)/MCL
NEUTROPHILS NFR BLD AUTO: 63 X10(3)/MCL
OPIATES UR QL SCN: NEGATIVE
PANEL A SPOT COUNT: 0
PANEL B SPOT COUNT: 0
PCP UR QL: NEGATIVE
PH UR STRIP.AUTO: 7 [PH] (ref 5–8)
PLATELET # BLD AUTO: 207 X10(3)/MCL (ref 130–400)
PLATELET # BLD AUTO: 212 X10(3)/MCL (ref 130–400)
PMV BLD AUTO: 10.5 FL (ref 7.4–10.4)
PMV BLD AUTO: 10.7 FL (ref 7.4–10.4)
POS CONT SPOT COUNT: NORMAL
POTASSIUM SERPL-SCNC: 4.6 MMOL/L (ref 3.5–5.1)
PROT SERPL-MCNC: 8.1 GM/DL (ref 6.4–8.2)
RBC # BLD AUTO: 5.36 X10(6)/MCL (ref 4.5–5.9)
RBC # BLD AUTO: 5.36 X10(6)/MCL (ref 4.5–5.9)
RPR SER QL: REACTIVE
SODIUM SERPL-SCNC: 138 MMOL/L (ref 136–145)
T PALLIDUM AB SER QL: REACTIVE
T-SPOT.TB: NORMAL
TEMPERATURE, URINE (OHS): 23 DEGC (ref 20–25)
TRIGL SERPL-MCNC: 229 MG/DL
TSH SERPL-ACNC: 0.76 MIU/L (ref 0.36–3.74)
VLDLC SERPL CALC-MCNC: 46 MG/DL
WBC # BLD AUTO: 7400 /MM3 (ref 4500–11500)
WBC # SPEC AUTO: 7.4 X10(3)/MCL (ref 4.5–11)
WBC # SPEC AUTO: 7.5 X10(3)/MCL (ref 4.5–11)

## 2019-07-24 ENCOUNTER — HISTORICAL (OUTPATIENT)
Dept: ADMINISTRATIVE | Facility: HOSPITAL | Age: 34
End: 2019-07-24

## 2019-07-24 LAB
CRP SERPL-MCNC: <0.3 MG/DL
ERYTHROCYTE [SEDIMENTATION RATE] IN BLOOD: 2 MM/HR (ref 0–15)
URATE SERPL-MCNC: 5.3 MG/DL (ref 3.5–7.2)

## 2020-01-24 ENCOUNTER — HISTORICAL (OUTPATIENT)
Dept: ADMINISTRATIVE | Facility: HOSPITAL | Age: 35
End: 2020-01-24

## 2020-01-24 LAB
HAV IGM SERPL QL IA: NORMAL
HBV CORE IGM SERPL QL IA: NORMAL
HBV SURFACE AG SERPL QL IA: NORMAL
HCV AB SERPL QL IA: NORMAL
RPR SER QL: REACTIVE
T PALLIDUM AB SER QL: REACTIVE

## 2020-02-02 ENCOUNTER — HISTORICAL (OUTPATIENT)
Dept: ADMINISTRATIVE | Facility: HOSPITAL | Age: 35
End: 2020-02-02

## 2020-02-20 LAB
BILIRUB SERPL-MCNC: NEGATIVE MG/DL
BLOOD URINE, POC: NEGATIVE
CLARITY, POC UA: CLEAR
COLOR, POC UA: NORMAL
GLUCOSE UR QL STRIP: NEGATIVE
KETONES UR QL STRIP: NEGATIVE
LEUKOCYTE EST, POC UA: NEGATIVE
NITRITE, POC UA: NEGATIVE
PH, POC UA: 7
PROTEIN, POC: NEGATIVE
SPECIFIC GRAVITY, POC UA: 1.01
UROBILINOGEN, POC UA: NORMAL

## 2020-03-12 ENCOUNTER — HISTORICAL (OUTPATIENT)
Dept: ADMINISTRATIVE | Facility: HOSPITAL | Age: 35
End: 2020-03-12

## 2020-03-19 ENCOUNTER — HISTORICAL (OUTPATIENT)
Dept: ADMINISTRATIVE | Facility: HOSPITAL | Age: 35
End: 2020-03-19

## 2020-03-19 LAB
FLUAV AG UPPER RESP QL IA.RAPID: NEGATIVE
FLUBV AG UPPER RESP QL IA.RAPID: NEGATIVE

## 2020-03-21 LAB — FINAL CULTURE: NORMAL

## 2020-04-03 ENCOUNTER — HISTORICAL (OUTPATIENT)
Dept: ADMINISTRATIVE | Facility: HOSPITAL | Age: 35
End: 2020-04-03

## 2020-04-03 LAB
ABS NEUT (OLG): 3.03 X10(3)/MCL (ref 2.1–9.2)
ALBUMIN SERPL-MCNC: 4 GM/DL (ref 3.4–5)
ALBUMIN/GLOB SERPL: 1.1 RATIO (ref 1.1–2)
ALP SERPL-CCNC: 94 UNIT/L (ref 45–117)
ALT SERPL-CCNC: 23 UNIT/L (ref 12–78)
AST SERPL-CCNC: 15 UNIT/L (ref 15–37)
BASOPHILS # BLD AUTO: 0.1 X10(3)/MCL (ref 0–0.2)
BASOPHILS NFR BLD AUTO: 1 %
BILIRUB SERPL-MCNC: 0.6 MG/DL (ref 0.2–1)
BILIRUBIN DIRECT+TOT PNL SERPL-MCNC: 0.1 MG/DL (ref 0–0.2)
BILIRUBIN DIRECT+TOT PNL SERPL-MCNC: 0.5 MG/DL
BUN SERPL-MCNC: 6 MG/DL (ref 7–18)
CALCIUM SERPL-MCNC: 8.9 MG/DL (ref 8.5–10.1)
CD3+CD4+ CELLS # SPEC: 1020 UNIT/L (ref 589–1505)
CD3+CD4+ CELLS NFR BLD: 42.2 % (ref 31–59)
CHLORIDE SERPL-SCNC: 102 MMOL/L (ref 98–107)
CO2 SERPL-SCNC: 29 MMOL/L (ref 21–32)
CREAT SERPL-MCNC: 1.2 MG/DL (ref 0.6–1.3)
EOSINOPHIL # BLD AUTO: 0.2 X10(3)/MCL (ref 0–0.9)
EOSINOPHIL NFR BLD AUTO: 3 %
ERYTHROCYTE [DISTWIDTH] IN BLOOD BY AUTOMATED COUNT: 13.7 % (ref 11.5–14.5)
GLOBULIN SER-MCNC: 3.6 GM/ML (ref 2.3–3.5)
GLUCOSE SERPL-MCNC: 98 MG/DL (ref 74–106)
HCT VFR BLD AUTO: 43.3 % (ref 40–51)
HGB BLD-MCNC: 14.6 GM/DL (ref 13.5–17.5)
IMM GRANULOCYTES # BLD AUTO: 0.01 10*3/UL
IMM GRANULOCYTES NFR BLD AUTO: 0 %
LYMPHOCYTES # BLD AUTO: 2.4 X10(3)/MCL (ref 0.6–4.6)
LYMPHOCYTES # BLD AUTO: 2418 UNIT/L (ref 1260–5520)
LYMPHOCYTES NFR BLD AUTO: 39 %
LYMPHOCYTES NFR LN MANUAL: 39 % (ref 28–48)
LYMPHOMA - T-CELL MARKERS SPEC-IMP: NORMAL
MCH RBC QN AUTO: 31.5 PG (ref 26–34)
MCHC RBC AUTO-ENTMCNC: 33.7 GM/DL (ref 31–37)
MCV RBC AUTO: 93.5 FL (ref 80–100)
MONOCYTES # BLD AUTO: 0.5 X10(3)/MCL (ref 0.1–1.3)
MONOCYTES NFR BLD AUTO: 8 %
NEUTROPHILS # BLD AUTO: 3.03 X10(3)/MCL (ref 2.1–9.2)
NEUTROPHILS NFR BLD AUTO: 49 %
PLATELET # BLD AUTO: 241 X10(3)/MCL (ref 130–400)
PMV BLD AUTO: 9.7 FL (ref 7.4–10.4)
POTASSIUM SERPL-SCNC: 4.2 MMOL/L (ref 3.5–5.1)
PROT SERPL-MCNC: 7.6 GM/DL (ref 6.4–8.2)
RBC # BLD AUTO: 4.63 X10(6)/MCL (ref 4.5–5.9)
SODIUM SERPL-SCNC: 136 MMOL/L (ref 136–145)
WBC # BLD AUTO: 6200 /MM3 (ref 4500–11500)
WBC # SPEC AUTO: 6.2 X10(3)/MCL (ref 4.5–11)

## 2020-06-19 ENCOUNTER — HISTORICAL (OUTPATIENT)
Dept: INTERNAL MEDICINE | Facility: CLINIC | Age: 35
End: 2020-06-19

## 2020-06-19 ENCOUNTER — HISTORICAL (OUTPATIENT)
Dept: ADMINISTRATIVE | Facility: HOSPITAL | Age: 35
End: 2020-06-19

## 2020-06-19 LAB
ABS NEUT (OLG): 4.78 X10(3)/MCL (ref 2.1–9.2)
ALBUMIN SERPL-MCNC: 4.2 GM/DL (ref 3.4–5)
ALBUMIN/GLOB SERPL: 1.2 RATIO (ref 1.1–2)
ALP SERPL-CCNC: 67 UNIT/L (ref 45–117)
ALT SERPL-CCNC: 18 UNIT/L (ref 12–78)
APPEARANCE, UA: CLEAR
AST SERPL-CCNC: 10 UNIT/L (ref 15–37)
BACTERIA #/AREA URNS AUTO: ABNORMAL /HPF
BASOPHILS # BLD AUTO: 0.1 X10(3)/MCL (ref 0–0.2)
BASOPHILS NFR BLD AUTO: 1 %
BILIRUB SERPL-MCNC: 0.6 MG/DL (ref 0.2–1)
BILIRUB UR QL STRIP: NEGATIVE
BILIRUBIN DIRECT+TOT PNL SERPL-MCNC: 0.2 MG/DL (ref 0–0.2)
BILIRUBIN DIRECT+TOT PNL SERPL-MCNC: 0.4 MG/DL
BUN SERPL-MCNC: 9 MG/DL (ref 7–18)
CALCIUM SERPL-MCNC: 8.8 MG/DL (ref 8.5–10.1)
CD3+CD4+ CELLS # SPEC: 1048 UNIT/L (ref 589–1505)
CD3+CD4+ CELLS NFR BLD: 37.8 % (ref 31–59)
CHLORIDE SERPL-SCNC: 105 MMOL/L (ref 98–107)
CHOLEST SERPL-MCNC: 137 MG/DL
CHOLEST/HDLC SERPL: 4.9 {RATIO} (ref 0–5)
CO2 SERPL-SCNC: 30 MMOL/L (ref 21–32)
COLOR UR: YELLOW
CREAT SERPL-MCNC: 1.2 MG/DL (ref 0.6–1.3)
DEPRECATED CALCIDIOL+CALCIFEROL SERPL-MC: 27.7 NG/ML (ref 30–80)
EOSINOPHIL # BLD AUTO: 0.2 X10(3)/MCL (ref 0–0.9)
EOSINOPHIL NFR BLD AUTO: 2 %
ERYTHROCYTE [DISTWIDTH] IN BLOOD BY AUTOMATED COUNT: 12 % (ref 11.5–14.5)
EST. AVERAGE GLUCOSE BLD GHB EST-MCNC: 114 MG/DL
GLOBULIN SER-MCNC: 3.4 GM/ML (ref 2.3–3.5)
GLUCOSE (UA): NEGATIVE
GLUCOSE SERPL-MCNC: 92 MG/DL (ref 74–106)
H PYLORI AB SER IA-ACNC: NEGATIVE
HAV AB SER QL IA: REACTIVE
HBA1C MFR BLD: 5.6 % (ref 4.2–6.3)
HBV SURFACE AB SER-ACNC: >1000 M[IU]/ML
HBV SURFACE AB SERPL IA-ACNC: REACTIVE M[IU]/ML
HCT VFR BLD AUTO: 43.7 % (ref 40–51)
HCV AB SERPL QL IA: NONREACTIVE
HDLC SERPL-MCNC: 28 MG/DL (ref 40–59)
HGB BLD-MCNC: 14.7 GM/DL (ref 13.5–17.5)
HGB UR QL STRIP: NEGATIVE
HYALINE CASTS #/AREA URNS LPF: ABNORMAL /LPF
IMM GRANULOCYTES # BLD AUTO: 0.01 10*3/UL
IMM GRANULOCYTES NFR BLD AUTO: 0 %
KETONES UR QL STRIP: NEGATIVE
LDLC SERPL CALC-MCNC: 60 MG/DL
LEUKOCYTE ESTERASE UR QL STRIP: NEGATIVE
LYMPHOCYTES # BLD AUTO: 2.8 X10(3)/MCL (ref 0.6–4.6)
LYMPHOCYTES # BLD AUTO: 2772 UNIT/L (ref 1260–5520)
LYMPHOCYTES NFR BLD AUTO: 33 %
LYMPHOCYTES NFR LN MANUAL: 33 % (ref 28–48)
LYMPHOMA - T-CELL MARKERS SPEC-IMP: NORMAL
MCH RBC QN AUTO: 32.5 PG (ref 26–34)
MCHC RBC AUTO-ENTMCNC: 33.6 GM/DL (ref 31–37)
MCV RBC AUTO: 96.7 FL (ref 80–100)
MONOCYTES # BLD AUTO: 0.5 X10(3)/MCL (ref 0.1–1.3)
MONOCYTES NFR BLD AUTO: 6 %
NEG CONT SPOT COUNT: NORMAL
NEUTROPHILS # BLD AUTO: 4.78 X10(3)/MCL (ref 2.1–9.2)
NEUTROPHILS NFR BLD AUTO: 57 %
NITRITE UR QL STRIP: NEGATIVE
PANEL A SPOT COUNT: 0
PANEL B SPOT COUNT: 0
PH UR STRIP: 7 [PH] (ref 4.5–8)
PLATELET # BLD AUTO: 192 X10(3)/MCL (ref 130–400)
PMV BLD AUTO: 9.8 FL (ref 7.4–10.4)
POS CONT SPOT COUNT: NORMAL
POTASSIUM SERPL-SCNC: 4.3 MMOL/L (ref 3.5–5.1)
PROT SERPL-MCNC: 7.6 GM/DL (ref 6.4–8.2)
PROT UR QL STRIP: NEGATIVE
RBC # BLD AUTO: 4.52 X10(6)/MCL (ref 4.5–5.9)
RBC #/AREA URNS AUTO: ABNORMAL /HPF
RPR SER QL: REACTIVE
SODIUM SERPL-SCNC: 138 MMOL/L (ref 136–145)
SP GR UR STRIP: 1.01 (ref 1–1.03)
SQUAMOUS #/AREA URNS LPF: ABNORMAL /LPF
T PALLIDUM AB SER QL: REACTIVE
T-SPOT.TB: NORMAL
TRIGL SERPL-MCNC: 243 MG/DL
TSH SERPL-ACNC: 0.56 MIU/L (ref 0.36–3.74)
UROBILINOGEN UR STRIP-ACNC: 2 MG/DL
VLDLC SERPL CALC-MCNC: 49 MG/DL
WBC # BLD AUTO: 8400 /MM3 (ref 4500–11500)
WBC # SPEC AUTO: 8.4 X10(3)/MCL (ref 4.5–11)
WBC #/AREA URNS AUTO: ABNORMAL /HPF

## 2020-10-01 ENCOUNTER — HISTORICAL (OUTPATIENT)
Dept: ADMINISTRATIVE | Facility: HOSPITAL | Age: 35
End: 2020-10-01

## 2020-10-01 LAB
ABS NEUT (OLG): 4.19 X10(3)/MCL (ref 2.1–9.2)
ALBUMIN SERPL-MCNC: 4.2 GM/DL (ref 3.4–5)
ALBUMIN/GLOB SERPL: 1 RATIO (ref 1.1–2)
ALP SERPL-CCNC: 93 UNIT/L (ref 45–117)
ALT SERPL-CCNC: 32 UNIT/L (ref 12–78)
AST SERPL-CCNC: 18 UNIT/L (ref 15–37)
BASOPHILS # BLD AUTO: 0.1 X10(3)/MCL (ref 0–0.2)
BASOPHILS NFR BLD AUTO: 1 %
BILIRUB SERPL-MCNC: 0.8 MG/DL (ref 0.2–1)
BILIRUBIN DIRECT+TOT PNL SERPL-MCNC: 0.2 MG/DL (ref 0–0.2)
BILIRUBIN DIRECT+TOT PNL SERPL-MCNC: 0.6 MG/DL
BUN SERPL-MCNC: 7 MG/DL (ref 7–18)
CALCIUM SERPL-MCNC: 9.5 MG/DL (ref 8.5–10.1)
CD3+CD4+ CELLS # SPEC: 1013 UNIT/L (ref 589–1505)
CD3+CD4+ CELLS NFR BLD: 30.2 % (ref 31–59)
CHLORIDE SERPL-SCNC: 106 MMOL/L (ref 98–107)
CO2 SERPL-SCNC: 30 MMOL/L (ref 21–32)
CREAT SERPL-MCNC: 1 MG/DL (ref 0.6–1.3)
EOSINOPHIL # BLD AUTO: 0.2 X10(3)/MCL (ref 0–0.9)
EOSINOPHIL NFR BLD AUTO: 3 %
ERYTHROCYTE [DISTWIDTH] IN BLOOD BY AUTOMATED COUNT: 14.1 % (ref 11.5–14.5)
GLOBULIN SER-MCNC: 4.1 GM/ML (ref 2.3–3.5)
GLUCOSE SERPL-MCNC: 82 MG/DL (ref 74–106)
HCT VFR BLD AUTO: 40.8 % (ref 40–51)
HGB BLD-MCNC: 13.5 GM/DL (ref 13.5–17.5)
IMM GRANULOCYTES # BLD AUTO: 0.03 10*3/UL
IMM GRANULOCYTES NFR BLD AUTO: 0 %
LYMPHOCYTES # BLD AUTO: 3.4 X10(3)/MCL (ref 0.6–4.6)
LYMPHOCYTES # BLD AUTO: 3354 UNIT/L (ref 1260–5520)
LYMPHOCYTES NFR BLD AUTO: 39 %
LYMPHOCYTES NFR LN MANUAL: 39 % (ref 28–48)
LYMPHOMA - T-CELL MARKERS SPEC-IMP: ABNORMAL
MCH RBC QN AUTO: 32.4 PG (ref 26–34)
MCHC RBC AUTO-ENTMCNC: 33.1 GM/DL (ref 31–37)
MCV RBC AUTO: 97.8 FL (ref 80–100)
MONOCYTES # BLD AUTO: 0.7 X10(3)/MCL (ref 0.1–1.3)
MONOCYTES NFR BLD AUTO: 8 %
NEUTROPHILS # BLD AUTO: 4.19 X10(3)/MCL (ref 2.1–9.2)
NEUTROPHILS NFR BLD AUTO: 49 %
PLATELET # BLD AUTO: 292 X10(3)/MCL (ref 130–400)
PMV BLD AUTO: 9.7 FL (ref 7.4–10.4)
POTASSIUM SERPL-SCNC: 4.2 MMOL/L (ref 3.5–5.1)
PROT SERPL-MCNC: 8.3 GM/DL (ref 6.4–8.2)
RBC # BLD AUTO: 4.17 X10(6)/MCL (ref 4.5–5.9)
SODIUM SERPL-SCNC: 138 MMOL/L (ref 136–145)
STREP A PCR (OHS): NOT DETECTED
WBC # BLD AUTO: 8600 /MM3 (ref 4500–11500)
WBC # SPEC AUTO: 8.6 X10(3)/MCL (ref 4.5–11)

## 2020-10-28 ENCOUNTER — HISTORICAL (OUTPATIENT)
Dept: ADMINISTRATIVE | Facility: HOSPITAL | Age: 35
End: 2020-10-28

## 2020-10-28 LAB
ABS NEUT (OLG): 5.72 X10(3)/MCL (ref 2.1–9.2)
ALBUMIN SERPL-MCNC: 4.4 GM/DL (ref 3.5–5)
ALBUMIN/GLOB SERPL: 1.2 RATIO (ref 1.1–2)
ALP SERPL-CCNC: 101 UNIT/L (ref 40–150)
ALT SERPL-CCNC: 8 UNIT/L (ref 0–55)
APPEARANCE, UA: CLEAR
AST SERPL-CCNC: 12 UNIT/L (ref 5–34)
BACTERIA #/AREA URNS AUTO: ABNORMAL /HPF
BASOPHILS # BLD AUTO: 0.1 X10(3)/MCL (ref 0–0.2)
BASOPHILS NFR BLD AUTO: 1 %
BILIRUB SERPL-MCNC: 0.6 MG/DL
BILIRUB UR QL STRIP: NEGATIVE
BILIRUBIN DIRECT+TOT PNL SERPL-MCNC: 0.2 MG/DL (ref 0–0.5)
BILIRUBIN DIRECT+TOT PNL SERPL-MCNC: 0.4 MG/DL (ref 0–0.8)
BUN SERPL-MCNC: 7 MG/DL (ref 8.9–20.6)
CALCIUM SERPL-MCNC: 9.9 MG/DL (ref 8.4–10.2)
CHLORIDE SERPL-SCNC: 100 MMOL/L (ref 98–107)
CO2 SERPL-SCNC: 31 MMOL/L (ref 22–29)
COLOR UR: YELLOW
CREAT SERPL-MCNC: 1.3 MG/DL (ref 0.73–1.18)
EOSINOPHIL # BLD AUTO: 0.2 X10(3)/MCL (ref 0–0.9)
EOSINOPHIL NFR BLD AUTO: 2 %
ERYTHROCYTE [DISTWIDTH] IN BLOOD BY AUTOMATED COUNT: 13.2 % (ref 11.5–14.5)
GLOBULIN SER-MCNC: 3.7 GM/DL (ref 2.4–3.5)
GLUCOSE (UA): NEGATIVE
GLUCOSE SERPL-MCNC: 89 MG/DL (ref 74–100)
HCT VFR BLD AUTO: 44.4 % (ref 40–51)
HGB BLD-MCNC: 14.6 GM/DL (ref 13.5–17.5)
HGB UR QL STRIP: NEGATIVE
HYALINE CASTS #/AREA URNS LPF: ABNORMAL /LPF
IMM GRANULOCYTES # BLD AUTO: 0.02 10*3/UL
IMM GRANULOCYTES NFR BLD AUTO: 0 %
KETONES UR QL STRIP: ABNORMAL
LEUKOCYTE ESTERASE UR QL STRIP: NEGATIVE
LYMPHOCYTES # BLD AUTO: 3 X10(3)/MCL (ref 0.6–4.6)
LYMPHOCYTES NFR BLD AUTO: 30 %
MCH RBC QN AUTO: 31.9 PG (ref 26–34)
MCHC RBC AUTO-ENTMCNC: 32.9 GM/DL (ref 31–37)
MCV RBC AUTO: 96.9 FL (ref 80–100)
MONOCYTES # BLD AUTO: 0.7 X10(3)/MCL (ref 0.1–1.3)
MONOCYTES NFR BLD AUTO: 8 %
NEUTROPHILS # BLD AUTO: 5.72 X10(3)/MCL (ref 2.1–9.2)
NEUTROPHILS NFR BLD AUTO: 59 %
NITRITE UR QL STRIP: NEGATIVE
PH UR STRIP: 6.5 [PH] (ref 4.5–8)
PLATELET # BLD AUTO: 275 X10(3)/MCL (ref 130–400)
PMV BLD AUTO: 10 FL (ref 7.4–10.4)
POTASSIUM SERPL-SCNC: 4 MMOL/L (ref 3.5–5.1)
PROT SERPL-MCNC: 8.1 GM/DL (ref 6.4–8.3)
PROT UR QL STRIP: 20 MG/DL
RBC # BLD AUTO: 4.58 X10(6)/MCL (ref 4.5–5.9)
RBC #/AREA URNS AUTO: ABNORMAL /HPF
SODIUM SERPL-SCNC: 136 MMOL/L (ref 136–145)
SP GR UR STRIP: 1.02 (ref 1–1.03)
SQUAMOUS #/AREA URNS LPF: ABNORMAL /LPF
UROBILINOGEN UR STRIP-ACNC: 3 MG/DL
WBC # SPEC AUTO: 9.7 X10(3)/MCL (ref 4.5–11)
WBC #/AREA URNS AUTO: ABNORMAL /HPF

## 2021-07-30 ENCOUNTER — HISTORICAL (OUTPATIENT)
Dept: ADMINISTRATIVE | Facility: HOSPITAL | Age: 36
End: 2021-07-30

## 2021-07-30 LAB
FLUAV AG UPPER RESP QL IA.RAPID: NEGATIVE
FLUBV AG UPPER RESP QL IA.RAPID: NEGATIVE
SARS-COV-2 RNA RESP QL NAA+PROBE: NOT DETECTED

## 2021-08-03 ENCOUNTER — HISTORICAL (OUTPATIENT)
Dept: ADMINISTRATIVE | Facility: HOSPITAL | Age: 36
End: 2021-08-03

## 2021-08-03 LAB
ABS NEUT (OLG): 4.18 X10(3)/MCL (ref 2.1–9.2)
ALBUMIN SERPL-MCNC: 4.2 GM/DL (ref 3.5–5)
ALBUMIN/GLOB SERPL: 1.2 RATIO (ref 1.1–2)
ALP SERPL-CCNC: 132 UNIT/L (ref 40–150)
ALT SERPL-CCNC: 21 UNIT/L (ref 0–55)
APPEARANCE, UA: CLEAR
AST SERPL-CCNC: 21 UNIT/L (ref 5–34)
BACTERIA #/AREA URNS AUTO: ABNORMAL /HPF
BASOPHILS # BLD AUTO: 0.1 X10(3)/MCL (ref 0–0.2)
BASOPHILS NFR BLD AUTO: 1 %
BILIRUB SERPL-MCNC: 0.5 MG/DL
BILIRUB UR QL STRIP: NEGATIVE
BILIRUBIN DIRECT+TOT PNL SERPL-MCNC: 0.2 MG/DL (ref 0–0.5)
BILIRUBIN DIRECT+TOT PNL SERPL-MCNC: 0.3 MG/DL (ref 0–0.8)
BUN SERPL-MCNC: 5.1 MG/DL (ref 8.9–20.6)
CALCIUM SERPL-MCNC: 9.8 MG/DL (ref 8.4–10.2)
CD3+CD4+ CELLS # SPEC: 617 UNIT/L (ref 589–1505)
CD3+CD4+ CELLS NFR BLD: 34.1 % (ref 31–59)
CHLORIDE SERPL-SCNC: 100 MMOL/L (ref 98–107)
CHOLEST SERPL-MCNC: 136 MG/DL
CHOLEST/HDLC SERPL: 5 {RATIO} (ref 0–5)
CO2 SERPL-SCNC: 31 MMOL/L (ref 22–29)
COLOR UR: YELLOW
CREAT SERPL-MCNC: 1.09 MG/DL (ref 0.73–1.18)
DEPRECATED CALCIDIOL+CALCIFEROL SERPL-MC: 22.4 NG/ML (ref 30–80)
EOSINOPHIL # BLD AUTO: 0.2 X10(3)/MCL (ref 0–0.9)
EOSINOPHIL NFR BLD AUTO: 2 %
ERYTHROCYTE [DISTWIDTH] IN BLOOD BY AUTOMATED COUNT: 13 % (ref 11.5–14.5)
EST. AVERAGE GLUCOSE BLD GHB EST-MCNC: 91.1 MG/DL
GLOBULIN SER-MCNC: 3.6 GM/DL (ref 2.4–3.5)
GLUCOSE (UA): NEGATIVE
GLUCOSE SERPL-MCNC: 83 MG/DL (ref 74–100)
HBA1C MFR BLD: 4.8 %
HCT VFR BLD AUTO: 44.2 % (ref 40–51)
HCV AB SERPL QL IA: NONREACTIVE
HDLC SERPL-MCNC: 25 MG/DL (ref 35–60)
HGB BLD-MCNC: 14.3 GM/DL (ref 13.5–17.5)
HGB UR QL STRIP: NEGATIVE
HYALINE CASTS #/AREA URNS LPF: ABNORMAL /LPF
IMM GRANULOCYTES # BLD AUTO: 0.01 10*3/UL
IMM GRANULOCYTES NFR BLD AUTO: 0 %
KETONES UR QL STRIP: NEGATIVE
LDLC SERPL CALC-MCNC: 65 MG/DL (ref 50–140)
LEUKOCYTE ESTERASE UR QL STRIP: 25 LEU/UL
LYMPHOCYTES # BLD AUTO: 1.8 X10(3)/MCL (ref 0.6–4.6)
LYMPHOCYTES # BLD AUTO: 1809 UNIT/L (ref 1260–5520)
LYMPHOCYTES NFR BLD AUTO: 27 %
LYMPHOCYTES NFR LN MANUAL: 27 % (ref 28–48)
LYMPHOMA - T-CELL MARKERS SPEC-IMP: ABNORMAL
MCH RBC QN AUTO: 31 PG (ref 26–34)
MCHC RBC AUTO-ENTMCNC: 32.4 GM/DL (ref 31–37)
MCV RBC AUTO: 95.7 FL (ref 80–100)
MONOCYTES # BLD AUTO: 0.5 X10(3)/MCL (ref 0.1–1.3)
MONOCYTES NFR BLD AUTO: 7 %
NEG CONT SPOT COUNT: NORMAL
NEUTROPHILS # BLD AUTO: 4.18 X10(3)/MCL (ref 2.1–9.2)
NEUTROPHILS NFR BLD AUTO: 62 %
NITRITE UR QL STRIP: NEGATIVE
NRBC BLD AUTO-RTO: 0 % (ref 0–0.2)
PANEL A SPOT COUNT: 1
PANEL B SPOT COUNT: 0
PH UR STRIP: 7 [PH] (ref 4.5–8)
PLATELET # BLD AUTO: 220 X10(3)/MCL (ref 130–400)
PMV BLD AUTO: 10.2 FL (ref 7.4–10.4)
POS CONT SPOT COUNT: NORMAL
POTASSIUM SERPL-SCNC: 4.2 MMOL/L (ref 3.5–5.1)
PROT SERPL-MCNC: 7.8 GM/DL (ref 6.4–8.3)
PROT UR QL STRIP: NEGATIVE
RBC # BLD AUTO: 4.62 X10(6)/MCL (ref 4.5–5.9)
RBC #/AREA URNS AUTO: ABNORMAL /HPF
RPR SER QL: REACTIVE
SODIUM SERPL-SCNC: 136 MMOL/L (ref 136–145)
SP GR UR STRIP: 1.01 (ref 1–1.03)
SQUAMOUS #/AREA URNS LPF: ABNORMAL /LPF
T PALLIDUM AB SER QL: REACTIVE
T-SPOT.TB: NORMAL
TRIGL SERPL-MCNC: 232 MG/DL (ref 34–140)
TSH SERPL-ACNC: 1.16 UIU/ML (ref 0.35–4.94)
UROBILINOGEN UR STRIP-ACNC: 2 MG/DL
VLDLC SERPL CALC-MCNC: 46 MG/DL
WBC # BLD AUTO: 6700 /MM3 (ref 4500–11500)
WBC # SPEC AUTO: 6.7 X10(3)/MCL (ref 4.5–11)
WBC #/AREA URNS AUTO: ABNORMAL /HPF

## 2021-08-31 ENCOUNTER — HISTORICAL (OUTPATIENT)
Dept: ADMINISTRATIVE | Facility: HOSPITAL | Age: 36
End: 2021-08-31

## 2021-11-17 ENCOUNTER — HISTORICAL (OUTPATIENT)
Dept: ADMINISTRATIVE | Facility: HOSPITAL | Age: 36
End: 2021-11-17

## 2021-11-17 LAB
ABS NEUT (OLG): 4.02 X10(3)/MCL (ref 2.1–9.2)
ALBUMIN SERPL-MCNC: 4.3 GM/DL (ref 3.5–5)
ALBUMIN/GLOB SERPL: 1.3 RATIO (ref 1.1–2)
ALP SERPL-CCNC: 87 UNIT/L (ref 40–150)
ALT SERPL-CCNC: 13 UNIT/L (ref 0–55)
AST SERPL-CCNC: 18 UNIT/L (ref 5–34)
BASOPHILS # BLD AUTO: 0.1 X10(3)/MCL (ref 0–0.2)
BASOPHILS NFR BLD AUTO: 1 %
BILIRUB SERPL-MCNC: 1.1 MG/DL
BILIRUBIN DIRECT+TOT PNL SERPL-MCNC: 0.4 MG/DL (ref 0–0.5)
BILIRUBIN DIRECT+TOT PNL SERPL-MCNC: 0.7 MG/DL (ref 0–0.8)
BUN SERPL-MCNC: 6.9 MG/DL (ref 8.9–20.6)
CALCIUM SERPL-MCNC: 9.8 MG/DL (ref 8.7–10.5)
CD3+CD4+ CELLS # SPEC: 875 UNIT/L (ref 589–1505)
CD3+CD4+ CELLS NFR BLD: 32 % (ref 31–59)
CHLORIDE SERPL-SCNC: 101 MMOL/L (ref 98–107)
CO2 SERPL-SCNC: 30 MMOL/L (ref 22–29)
CREAT SERPL-MCNC: 1 MG/DL (ref 0.73–1.18)
EOSINOPHIL # BLD AUTO: 0.2 X10(3)/MCL (ref 0–0.9)
EOSINOPHIL NFR BLD AUTO: 2 %
ERYTHROCYTE [DISTWIDTH] IN BLOOD BY AUTOMATED COUNT: 12.8 % (ref 11.5–14.5)
GLOBULIN SER-MCNC: 3.4 GM/DL (ref 2.4–3.5)
GLUCOSE SERPL-MCNC: 95 MG/DL (ref 74–100)
HCT VFR BLD AUTO: 44.2 % (ref 40–51)
HGB BLD-MCNC: 14.9 GM/DL (ref 13.5–17.5)
IMM GRANULOCYTES # BLD AUTO: 0.01 10*3/UL
IMM GRANULOCYTES NFR BLD AUTO: 0 %
LYMPHOCYTES # BLD AUTO: 2.6 X10(3)/MCL (ref 0.6–4.6)
LYMPHOCYTES # BLD AUTO: NORMAL UNIT/L (ref 1260–5520)
LYMPHOCYTES NFR BLD AUTO: 35 %
LYMPHOCYTES NFR LN MANUAL: NORMAL % (ref 28–48)
MCH RBC QN AUTO: 30.8 PG (ref 26–34)
MCHC RBC AUTO-ENTMCNC: 33.7 GM/DL (ref 31–37)
MCV RBC AUTO: 91.5 FL (ref 80–100)
MONOCYTES # BLD AUTO: 0.6 X10(3)/MCL (ref 0.1–1.3)
MONOCYTES NFR BLD AUTO: 8 %
NEUTROPHILS # BLD AUTO: 4.02 X10(3)/MCL (ref 2.1–9.2)
NEUTROPHILS NFR BLD AUTO: 54 %
NRBC BLD AUTO-RTO: 0 % (ref 0–0.2)
PLATELET # BLD AUTO: 263 X10(3)/MCL (ref 130–400)
PMV BLD AUTO: 10.2 FL (ref 7.4–10.4)
POTASSIUM SERPL-SCNC: 4.2 MMOL/L (ref 3.5–5.1)
PROT SERPL-MCNC: 7.7 GM/DL (ref 6.4–8.3)
RBC # BLD AUTO: 4.83 X10(6)/MCL (ref 4.5–5.9)
RPR SER QL: REACTIVE
SODIUM SERPL-SCNC: 138 MMOL/L (ref 136–145)
T PALLIDUM AB SER QL: REACTIVE
WBC # BLD AUTO: NORMAL /MM3 (ref 4500–11500)
WBC # SPEC AUTO: 7.5 X10(3)/MCL (ref 4.5–11)

## 2022-04-11 ENCOUNTER — HISTORICAL (OUTPATIENT)
Dept: ADMINISTRATIVE | Facility: HOSPITAL | Age: 37
End: 2022-04-11
Payer: MEDICAID

## 2022-04-29 VITALS
SYSTOLIC BLOOD PRESSURE: 111 MMHG | WEIGHT: 122.56 LBS | OXYGEN SATURATION: 95 % | BODY MASS INDEX: 18.57 KG/M2 | HEIGHT: 68 IN | DIASTOLIC BLOOD PRESSURE: 74 MMHG

## 2022-04-30 NOTE — H&P
"   Patient:   Ezra Kennedy             MRN: [id number]            FIN: [id number]               Age:   32 years     Sex:  Male     :  1985   Associated Diagnoses:   None   Author:   Jonathan Tijerina MD      Basic Information   Admit information:  PCP:  Dr. Kimani Walker  ID: [facility] .    Source of history:  Self, Medical record.    Referral source:  Emergency department.       Chief Complaint   2017 11:56 CDT      pt to ER for AMS, reports HA and rib pain x 1 week, woke up today with facial pain, AMS, EMS states he cannot talk, can only moan, +HIV, recent neurosyphilis dx, pt follows commands        History of Present Illness   33 y/o CM with history of HIV and neurosyphilis (He was last treated for neurosyphillis in [facility] with 2 weeks of IV antibiotics. He was also treated with Bicillin on 8/15/17, 17, and 17) presents to ED and states he awoke with headache and right sided weakness which worsened prompting ED visit.  Pt took 2 Excedrin and later took Maxalt when Excedrin did not provide relief. States he has had paralysis of his right side associated with headaches in the past, but today lasted longer.  After taking medication at 10:30 he told his mother he was going to take a nap and instructed her to wake him an hour later. According to pt's mother she began noticing he was trying to get her attention while laying down but he could not communicate with her due to slurred speech and she says he was very "limp". Pt's mother then called EMS at 11:21. Pt also has had right rib pain since last week. Pt denies history of seizures. Pt normally has right side paralysis associated with headaches but it usually resolves. Pt says he is taking all of his medications. Pt began hormone replacement therapy 3 months ago. CD4 count 871 on 17.  He denies any and all sensation in the lower extremities and paralysis in the lower extremities and the recent history states he has " had some decreased sensation in the upper extremities over the past weeks intermittently.    He underwent LP by the ED physician and is on a stretcher in ED 31.  He is awake and alert and conversant and able to provide a detailed medical history. His hand grasp is a little weak but he doesn't appear to exerting much effort.  He will be admitted for observation and MRI of the brain.         Review of Systems   Constitutional:  Negative except as documented in history of present illness.    Eye:  Negative except as documented in history of present illness.    Ear/Nose/Mouth/Throat:  Negative except as documented in history of present illness.    Respiratory:  Negative except as documented in history of present illness.    Cardiovascular:  Negative except as documented in history of present illness.    Gastrointestinal:  Negative except as documented in history of present illness.    Genitourinary:  Negative except as documented in history of present illness.    Hematology/Lymphatics:  Negative except as documented in history of present illness.    Endocrine:  Negative except as documented in history of present illness.    Immunologic:  Negative except as documented in history of present illness.    Musculoskeletal:  Negative except as documented in history of present illness.    Integumentary:  Negative except as documented in history of present illness.    Neurologic:  Negative except as documented in history of present illness.    Psychiatric:  Negative except as documented in history of present illness.    Except as documented, all systems were reviewed and are negative.      Health Status   Allergies:    Allergic Reactions (Selected)  No Known Allergies,    Allergies (1) Active Reaction  No Known Allergies None Documented     Current medications:  (Selected)   Prescriptions  Prescribed  Antivert 25 mg oral tablet: 25 mg = 1 tab(s), Oral, TID, # 90 tab(s), 3 Refill(s), Pharmacy: [facility]  Flonase 50  mcg/inh nasal spray: 2 spray(s), Nasal, Daily, # 1 bottle(s), 0 Refill(s), [facility]  Genvoya oral tablet: 1 tab(s), Oral, Daily, with food, # 30 tab(s), 1 Refill(s), Pharmacy: [facility]  Maxalt 10 mg oral tablet: 10 mg = 1 tab(s), Oral, Daily, PRN PRN for migraine headache, may repeat dose every 2 hours up to a maximum of 30 mg in 24 hours, # 12 tab(s), 1 Refill(s), Pharmacy: [facility]  diclofenac sodium 75 mg oral delayed release tablet: 75 mg = 1 tab(s), Oral, BID, # 28 tab(s), 0 Refill(s), Pharmacy: [facility] [address]  ergocalciferol 50,000 intl units (1.25 mg) oral capsule: 50,000 International unit = 1 cap(s), Oral, qMonth, # 1 cap(s), 3 Refill(s), Pharmacy: [facility]  loratadine 10 mg oral capsule: 10 mg = 1 cap(s), Oral, Daily, # 15 cap(s), 0 Refill(s), [facility] [id number]  Documented Medications  Documented  ESTRADIOL 2MG TABLETS: 2 mg = 1 tab(s), Oral, Daily  GABAPENTIN 300MG CAPSULES: 300 mg = 1 cap(s), Oral, TID  SPIRONOLACTONE 50MG TABLETS: 50 mg = 1 tab(s), Oral, Daily  Trokendi Xr 25 Mg Capsule: 25 mg = 1 cap(s), Oral, qPM  fluvoxamine 50 mg oral tablet: 50 mg = 1 tab(s), Oral, Once a day (at bedtime), 0 Refill(s),    No qualifying data available     Problem list:    All Problems  Vitamin D deficiency(  Confirmed  ) / SNOMED CT 57969020 / Confirmed      Histories   Past Medical History:    Active  Anxiety(  Confirmed  ) (00857241)  Depressive disorder(  Confirmed  ) (48168713)  HIV (human immunodeficiency virus infection)(  Confirmed  ) (063738244)  Hyperlipidemia(  Confirmed  ) (52732387)  Insomnia(  Confirmed  ) (663409312)  Tobacco use disorder(  Confirmed  ) (634993081)  Vitamin D deficiency(  Confirmed  ) (32163713)  Fibromyalgia (958168801)  Resolved  Bicipital tenosynovitis(  Confirmed  ) (95363904):  Resolved on 4/9/2015 at 30 years.  Hair disease(  Confirmed  ) (569531561):  Resolved on 4/9/2015  at 30 years.  Neurosyphilis (71802294):  Resolved.   Family History:    Diabetes mellitus type 2  Mother  Father  Hyperthyroidism.  Mother  Congestive heart disease.  Mother  Father  Hypertension.  Mother  Father  Alcoholism.  Brother  Depression.  Brother  Hyperglycemia.  Father  Drug addiction.  Brother     Procedure history: Negative.   Social History        Social & Psychosocial Habits    Alcohol  04/29/2015 Risk Assessment: Low Risk    04/29/2015  Use: Current    Type: Beer    Frequency: 1-2 times per month    Previous treatment: None    Has alcohol use interfered with work or home life? No    Do you ever drink more than intended? No    Has anyone been hurt or at risk by your drinking? No    Ready to change: No    Concerns about alcohol use in household: No    Employment/School  07/07/2015  Status: Unemployed    Highest education: High school    Home/Environment  07/07/2015  Lives with: Mother    Living situation: Home/Independent    Alcohol abuse in household: No    Substance abuse in household: No    Smoker in household: Yes    Injuries/Abuse/Neglect in household: No    Feels unsafe at home: No    Nutrition/Health  07/07/2015  Type of diet: Regular    Substance Abuse    07/26/2017  Use: Current    Type: Marijuana    Frequency: 3-5 times per week    Tobacco  04/29/2015 Risk Assessment: High Risk    07/07/2015  Use: Current every day smoker    Type: Cigarettes    Tobacco use per day: 3    Ready to change: Yes  .        Physical Examination      Vital Signs (last 24 hrs)_____  Last Charted___________  Temp Oral     36.9 DegC  (SEP 25 11:56)  Heart Rate Peripheral   66 bpm  (SEP 25 17:09)  Resp Rate         18 br/min  (SEP 25 17:09)  SBP      115 mmHg  (SEP 25 17:09)  DBP      75 mmHg  (SEP 25 17:09)  SpO2      100 %  (SEP 25 17:09)     General:  Alert and oriented, No acute distress, has facial cosmetics giving a feminine appearance, thin; chronically ill appearing.    Eye:  Pupils are equal, round and reactive  to light, Extraocular movements are intact, Normal conjunctiva.    HENT:  Normocephalic, Normal hearing, Oral mucosa is moist, No pharyngeal erythema.    Neck:  Supple, Non-tender.    Respiratory:  Lungs are clear to auscultation, Respirations are non-labored, Breath sounds are equal, Symmetrical chest wall expansion.    Cardiovascular:  Normal rate, Regular rhythm, No murmur, No edema.    Gastrointestinal:  Soft, Non-tender, Non-distended, Normal bowel sounds, No organomegaly.    Genitourinary:  No costovertebral angle tenderness.    Lymphatics:  No lymphadenopathy neck, axilla, groin.    Musculoskeletal:  Normal range of motion, Normal strength, No tenderness, No swelling, No deformity.    Integumentary:  Warm, Dry, Pink, Intact, No rash, long green colored fingernails.    Neurologic:  Alert, Oriented, No focal deficits, Cranial Nerves II-XII are grossly intact.    Cognition and Speech:  Oriented, Speech clear and coherent, Functional cognition intact.    Psychiatric:  Cooperative, flat affect; psychomotor retardation.       Review / Management   Results review:     Labs (Last four charted values)  Glucose              98 (SEP 25) .    Laboratory Results   Today's Lab Results : PowerNote Discrete Results   9/25/2017 15:10 CDT      WBC CSF 1                 0 /mm3                             WBC CSF 3                 0 /mm3                             RBC CSF 1                 1 /mm3  HI                             RBC CSF 3                 1 /mm3  HI                             Appear CSF 1              Clear                             Appear CSF 3              Clear                             Color CSF 1               Colorless                             Color CSF 3               Colorless                             Glucose CSF               62 mg/dL                             Protein CSF 2             63.5 mg/dL  HI                             Volume CSF 1              1.2 mL  NA                              Volume CSF 3              2.0 mL  NA                             Gram Stain                Review                             Joan Ink Prep CSF        Review                             INGA Prep                  Review    9/25/2017 14:06 CDT      POC Troponin              0.02 ng/mL    9/25/2017 14:00 CDT      WBC                       9.6 x10(3)/mcL                             RBC                       4.78 x10(6)/mcL                             Hgb                       16.2 gm/dL                             Hct                       45.8 %                             Platelet                  206 x10(3)/mcL                             MCV                       95.8 fL  HI                             MCH                       33.9 pg  HI                             MCHC                      35.4 gm/dL                             RDW                       11.9 %                             MPV                       10.8 fL                             Abs Neut                  6.06 x10(3)/mcL                             Neutro Auto               63 %  NA                             Lymph Auto                31 %  NA                             Mono Auto                 5 %  NA                             Eos Auto                  1 %  NA                             Abs Eos                   0.1 x10(3)/mcL                             Basophil Auto             0 %  NA                             Abs Neutro                6.06 x10(3)/mcL                             Abs Lymph                 2.9 x10(3)/mcL                             Abs Mono                  0.5 x10(3)/mcL                             Abs Baso                  0.0 x10(3)/mcL                             Sodium Lvl                134 mmol/L  LOW                             Potassium Lvl             4.6 mmol/L                             Chloride                  103 mmol/L                             CO2                       24.0 mmol/L                              Calcium Lvl               9.7 mg/dL                             Glucose Lvl               98 mg/dL                             BUN                       8.0 mg/dL                             Creatinine                1.10 mg/dL                             eGFR-AA                   >60 mL/min/1.73 m2  NA                             eGFR-CELE                  >60 mL/min/1.73 m2  NA                             Bili Total                0.8 mg/dL                             Bili Direct               0.10 mg/dL                             Bili Indirect             0.70 mg/dL                             AST                       17 unit/L                             ALT                       27 unit/L                             Alk Phos                  86 unit/L                             Total Protein             8.5 gm/dL  HI                             Albumin Lvl               4.60 gm/dL                             Globulin                  3.90 gm/dL  HI                             A/G Ratio                 1.2  NA                             Ethanol Lvl               4.0 mg/dL  HI    9/25/2017 13:29 CDT      UA Appear                 Clear                             UA Color                  Yellow                             UA Spec Grav              1.003                             UA Bili                   Negative                             UA pH                     7.0                             UA Urobilinogen           0.2                             UA Blood                  Negative                             UA Glucose                Negative                             UA Ketones                Negative                             UA Protein                Negative                             UA Nitrite                Negative                             UA Leuk Est               Negative                             UA WBC Man                None Seen                             UA RBC                     None Seen                             UA Bacteria               None Seen /HPF                             UA Squam Epithelial       None Seen                             U pH                      7.0                             U Spec Grav               1.003                             U Amph Scr                NEG                             U Yessenia Scr                NEG                             U Benzodia Scr            NEG                             U Cannab Scr              POS                             U Cocaine Scr             NEG                             U Opiate Scr              NEG                             U Phencyclidine Scr       NEG     , Reviewed labs   Chest x-ray results   Reviewed radiologist's report   Radiology results   Reviewed radiologist's report, Rad Results (ST)   Accession: [id number]  Order: XR Chest 1 View  Report Dt/Tm: 09/25/2017 15:03  Report:      EXAM: XR Chest 1 View     INDICATION: altered level of consciousness     TECHNIQUE: Frontal portable view of the chest is obtained.     COMPARISON: 7/26/2017     FINDINGS: The cardiomediastinal silhouette is normal in size and  contour. The lungs are clear without focal consolidation, pleural  effusion or pneumothorax. The osseous structures are grossly  unremarkable.        IMPRESSION:   No acute cardiopulmonary process.      Accession: [id number]  Order: CT Head W/O Contrast  Report Dt/Tm: 09/25/2017 14:24  Report:   CT Head W/O Contrast     REASON FOR EXAM:  Altered level of Consciousness     Total DLP 1077 mGy*cm     COMPARISON: None.     FINDINGS:     The ventricles and subarachnoid spaces are normal.  There is no  extra-axial fluid collection, hemorrhage, or mass effect. There is no  abnormal intra-axial attenuation.     IMPRESSION:     NEGATIVE STUDY.         Documentation reviewed      Impression and Plan   Encephalopathy - resolved  HIV Ab positive  Transgendered on reassignment treatment with  Aldactone and estradiol  Fibromyalgia  Tobacco and marijuana abuse        Plan:  Follow up on CSFresults  MRI of brain  Observe overnight      PCP:  Dr. Kimani Walker  Code status: full  DVT prophylaxis: SCD's

## 2022-04-30 NOTE — DISCHARGE SUMMARY
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   Immunizations   2017 - influenza virus vaccine, inactivated      Discharge Medications   Prescribed  tramadol 1 tab(s), Oral, q8hr, PRN for pain  Continue  cobicistat/elvitegravir/emtricitabine/tenofov (Genvoya oral tablet) 1 tab(s), Oral, Daily  diclofenac (diclofenac sodium 75 mg oral delayed release tablet) 75 mg, Oral, BID  ergocalciferol (ergocalciferol 50,000 intl units (1.25 mg) oral capsule) 50,000 International unit, Oral, qMonth  estradiol (ESTRADIOL 2MG TABLETS) 6 mg, Oral, Daily  fluticasone nasal (Flonase 50 mcg/inh nasal spray) 2 spray(s), Nasal, Daily  fluvoxaMINE (fluvoxamine 50 mg oral tablet) 50 mg, Oral, Once a day (at bedtime)  gabapentin (GABAPENTIN 300MG CAPSULES) 300 mg, Oral, TID  loratadine (loratadine 10 mg oral capsule) 10 mg, Oral, Daily  meclizine (Antivert 25 mg oral tablet) 25 mg, Oral, TID  rizatriptan (Maxalt 10 mg oral tablet) 10 mg, Oral, Daily, PRN for migraine headache  spironolactone (SPIRONOLACTONE 50MG TABLETS) 50 mg, Oral, Daily  Discontinue  topiramate (Trokendi Xr 25 Mg Capsule) 25 mg, Oral, qPM        Education   Stroke Prevention, Easy-to-Read  Lumbar Puncture, Care After  Discharge Diet - Regular  "  Discharge - Home   Discharge Activity - Activity as Tolerated         Followup   Kimani Walker MD, within 1 to 2 weeks  [facility]        Hospital Course   Hospital Course   Time Spent on Discharge: 45 minutes.     31 y/o CM with history of HIV and neurosyphilis (He was last treated for neurosyphillis in [facility] with 2 weeks of IV antibiotics. He was also treated with Bicillin on 8/15/17, 8/18/17, and 8/22/17) presents to ED and states he awoke with headache and right sided weakness which worsened prompting ED visit.  Pt took 2 Excedrin and later took Maxalt when Excedrin did not provide relief. States he has had paralysis of his right side associated with headaches in the past, but today lasted longer.  After taking medication at 10:30 he told his mother he was going to take a nap and instructed her to wake him an hour later. According to pt's mother she began noticing he was trying to get her attention while laying down but he could not communicate with her due to slurred speech and she says he was very "limp". Pt's mother then called EMS at 11:21. Pt also has had right rib pain since last week. Pt denies history of seizures. Pt normally has right side paralysis associated with headaches but it usually resolves. Pt says he is taking all of his medications. Pt began hormone replacement therapy 3 months ago. CD4 count 871 on 9/1/17.  He denies any and all sensation in the lower extremities and paralysis in the lower extremities and the recent history states he has had some decreased sensation in the upper extremities over the past weeks intermittently.    He underwent LP by the ED physician and is on a stretcher in ED 31.  He is awake and alert and conversant and able to provide a detailed medical history. His hand grasp is a little weak but he doesn't appear to exerting much effort.  He will be admitted for observation and MRI of the brain.    MRI revealed:  1. Subtle focal abnormal T2 " signal in the periventricular white matter  of both occipital lobes dorsal to the occipital horns of both lateral  ventricles. These areas are nonspecific and are unchanged from the  previous exam. Differential considerations include focal gliosis and  and migraine headaches occurring at the time of exam. Early, subtle  white matter changes associated with HIV disease including HIV  associated cerebral vasculopathy. Very early changes from HIV  associated encephalopathy is possible, but there is no significant  interval change from the comparison exam to suggest any progression.  Other entities associated with HIV disease that are much less likely  given no interval worsening since the previous exam and include  opportunistic infections and various neoplastic processes. Otherwise,  no acute intracranial process is identified.   2. Scattered chronic sinus disease.    Pt was evaluated by the neurology NP and diagnosed with hemiplegic migraine and recommended starting Depakote 250mg BID for migraine prevention.  Pt was discharged in stable and improved condition.      Physical Examination      No qualifying data available     General:  Alert and oriented, No acute distress, has facial cosmetics giving a feminine appearance, thin; chronically ill appearing.    Eye:  Pupils are equal, round and reactive to light, Extraocular movements are intact, Normal conjunctiva.    HENT:  Normocephalic, Normal hearing, Oral mucosa is moist, No pharyngeal erythema.    Neck:  Supple, Non-tender.    Respiratory:  Lungs are clear to auscultation, Respirations are non-labored, Breath sounds are equal, Symmetrical chest wall expansion.    Cardiovascular:  Normal rate, Regular rhythm, No murmur, No edema.    Gastrointestinal:  Soft, Non-tender, Non-distended, Normal bowel sounds, No organomegaly.    Genitourinary:  No costovertebral angle tenderness.    Lymphatics:  No lymphadenopathy neck, axilla, groin.    Musculoskeletal:  Normal range  of motion, Normal strength, No tenderness, No swelling, No deformity.    Integumentary:  Warm, Dry, Pink, Intact, No rash, long green colored fingernails.    Neurologic:  Alert, Oriented, No focal deficits, Cranial Nerves II-XII are grossly intact.    Cognition and Speech:  Oriented, Speech clear and coherent, Functional cognition intact.    Psychiatric:  Cooperative, flat affect; psychomotor retardation.           Discharge Plan   Discharge Summary Plan   Discharge disposition: discharge to home self care.     Prescriptions: reviewed with patient, called to pharmacy.     Course   Progressing as expected.     Education and Follow-up   Counseled: patient, regarding diagnosis, regarding treatment, regarding medications.

## 2022-04-30 NOTE — ED PROVIDER NOTES
Patient:   Ezra Kennedy             MRN: [id number]            FIN: [id number]               Age:   32 years     Sex:  Male     :  1985   Associated Diagnoses:   Paralysis of right upper extremity; Paralysis of right lower extremity; Headache   Author:   Radha TAO, Js JEAN      Basic Information   Time seen: Date & time 2017 13:13:00.   History source: [facility].   Arrival mode: Ambulance.   History limitation: None.   Additional information: Patient's physician(s): Alanna Cuenca, Chief Complaint from Nursing Triage Note : Chief Complaint   2017 11:56 CDT      Chief Complaint           pt to ER for AMS, reports HA and rib pain x 1 week, woke up today with facial pain, AMS, EMS states he cannot talk, can only moan, +HIV, recent neurosyphilis dx, pt follows commands  .      History of Present Illness   The patient presents with Patient with history of HIV and currently undergoing hormone replacement therapy for gender reassignment presenting with reports of right sided facial weakness onset this morning.  Patient reports headache and rib pain for the last week.  Mom reports that he woke up this morning with facial pain.  Patient has history of neurosyphilis. ANGEL Jon in sort and     I, , assumed care of this patient at 13:41.    31 y/o CM with history of HIV and neurosyphilis (He was last treated for neurosyphillis in [facility] with 2 weeks of IV antibiotics. He was also treated with Bicillin on 8/15/17, 17, and 17) presents to Ed.  Pt states he awoke with headache and right sided weakness which worsened prompting ED visit. States he has had paralysis of his right side associated with headaches in the past, but today lasted longer. Pt took 2 Excedrin and later took Maxalt when Excedrin did not provide relief. After taking medication at 1030 he told his mother he was going to take a nap and instructed her to wake him an hour later.  According to pt's mother she began noticing he was trying to get her attention while laying down but he could not communicate with her due to slurred speech and she says he was very limp. Pt's mother then called EMS at 1121. Pt also has had right rib pain since last week. Pt denies history of seizures. Pt normally has right side paralysis associated with headaches but it usually resolves. Pt says he is taking all of his medications. Pt began hormone replacement therapy 3 months ago. CD4 count 871 on 9/1/17.  He denies any and all sensation in the lower extremities are paralysis in the lower extremities and the recent history states he has had some decreased sensation in the upper extremities over the past weeks intermittently.  The onset was 4  hours ago.  The course/duration of symptoms is constant.  The degree at onset was moderate.  The degree at present is moderate.  Baseline status: alert and oriented X 4.   The exacerbating factor is none.  The relieving factor is none.  Risk factors consist of HIV and Neurosyphillis.  Prior episodes: none.  Therapy today: emergency medical services.  Associated symptoms: headache and neck pain.        Review of Systems   Constitutional symptoms:  Negative except as documented in HPI.   Skin symptoms:  Negative except as documented in HPI.   Eye symptoms:  Negative except as documented in HPI.   ENMT symptoms:  Negative except as documented in HPI.   Respiratory symptoms:  Negative except as documented in HPI.   Cardiovascular symptoms:  Negative except as documented in HPI.   Gastrointestinal symptoms:  Negative except as documented in HPI.   Genitourinary symptoms:  Negative except as documented in HPI.   Musculoskeletal symptoms:  Negative except as documented in HPI, right rib pain.    Neurologic symptoms:  Headache, weakness, decreased motor function, decreased sensation, speech problem.       Health Status   Medications:  (Selected)   Prescriptions  Prescribed  Antivert  25 mg oral tablet: 25 mg = 1 tab(s), Oral, TID, # 90 tab(s), 3 Refill(s), Pharmacy: [facility]  Flonase 50 mcg/inh nasal spray: 2 spray(s), Nasal, Daily, # 1 bottle(s), 0 Refill(s), Pharmacy: [facility] "[employer] [id number]  Genvoya oral tablet: 1 tab(s), Oral, Daily, with food, # 30 tab(s), 1 Refill(s), Pharmacy: [facility]  Maxalt 10 mg oral tablet: 10 mg = 1 tab(s), Oral, Daily, PRN PRN for migraine headache, may repeat dose every 2 hours up to a maximum of 30 mg in 24 hours, # 12 tab(s), 1 Refill(s), Pharmacy: [facility]  diclofenac sodium 75 mg oral delayed release tablet: 75 mg = 1 tab(s), Oral, BID, # 28 tab(s), 0 Refill(s), Pharmacy: [facility] "[employer] [id number]  ergocalciferol 50,000 intl units (1.25 mg) oral capsule: 50,000 International unit = 1 cap(s), Oral, qMonth, # 1 cap(s), 3 Refill(s), Pharmacy: [facility]  loratadine 10 mg oral capsule: 10 mg = 1 cap(s), Oral, Daily, # 15 cap(s), 0 Refill(s), Pharmacy: [facility] "[employer] [id number]  Documented Medications  Documented  ESTRADIOL 2MG TABLETS: 2 mg = 1 tab(s), Oral, Daily  GABAPENTIN 300MG CAPSULES: 300 mg = 1 cap(s), Oral, TID  SPIRONOLACTONE 50MG TABLETS: 50 mg = 1 tab(s), Oral, Daily  Trokendi Xr 25 Mg Capsule: 25 mg = 1 cap(s), Oral, qPM  fluvoxamine 50 mg oral tablet: 50 mg = 1 tab(s), Oral, Once a day (at bedtime), 0 Refill(s).      Past Medical/ Family/ Social History   Medical history:    Active  Vitamin D deficiency(  Confirmed  ) (46014670)  Resolved  Anxiety(  Confirmed  ) ([id number]):  Resolved on 4/9/2015 at 30 years.  Bicipital tenosynovitis(  Confirmed  ) ([id number]):  Resolved on 4/9/2015 at 30 years.  Depressive disorder(  Confirmed  ) ([id number]):  Resolved on 4/9/2015 at 30 years.  Hair disease(  Confirmed  ) (201962766):  Resolved on 4/9/2015 at 30 years.  HIV (human immunodeficiency virus infection)(  Confirmed  ) ([id number]):  Resolved on 4/9/2015 at 30 years.  Hyperlipidemia(  Confirmed  )  (54052250):  Resolved on 4/9/2015 at 30 years.  Insomnia(  Confirmed  ) (910720002):  Resolved on 4/9/2015 at 30 years.  Tobacco use disorder(  Confirmed  ) ([id number]):  Resolved on 4/9/2015 at 30 years.  Fibromyalgia (307259184):  Resolved..   Family history:    Diabetes mellitus type 2  Mother  Father  Hyperthyroidism.  Mother  Congestive heart disease.  Mother  Father  Hypertension.  Mother  Father  Alcoholism.  Brother  Depression.  Brother  Hyperglycemia.  Father  Drug addiction.  Brother  .   Surgical history.   Physical Examination               Vital Signs   Vital Signs   9/25/2017 13:14 CDT      Peripheral Pulse Rate     71 bpm                             Heart Rate Monitored      70 bpm                             Respiratory Rate          21 br/min                             SpO2                      100 %                             Systolic Blood Pressure   121 mmHg                             Diastolic Blood Pressure  86 mmHg                             Mean Arterial Pressure, Cuff              98 mmHg    9/25/2017 12:58 CDT      Peripheral Pulse Rate     82 bpm                             Heart Rate Monitored      79 bpm                             Respiratory Rate          18 br/min                             SpO2                      100 %                             Oxygen Therapy            Room air    9/25/2017 12:31 CDT      Peripheral Pulse Rate     80 bpm                             Heart Rate Monitored      80 bpm                             Respiratory Rate          21 br/min                             SpO2                      100 %                             Oxygen Therapy            Room air                             Systolic Blood Pressure   118 mmHg                             Diastolic Blood Pressure  77 mmHg                             Mean Arterial Pressure, Cuff              91 mmHg    9/25/2017 11:56 CDT      Temperature Oral          36.9 DegC                              Peripheral Pulse Rate     96 bpm                             Respiratory Rate          18 br/min                             SpO2                      98 %                             Oxygen Therapy            Room air                             Systolic Blood Pressure   120 mmHg                             Diastolic Blood Pressure  86 mmHg  .   Measurements   9/25/2017 11:56 CDT      Weight Dosing             45.5 kg                             Weight Measured and Calculated in Lbs     100.31 lb                             Weight Estimated          45.5 kg                             Height/Length Dosing      170 cm                             Height/Length Estimated   170 cm                             Body Mass Index Estimated 15.74 kg/m2  .   Oxygen saturation.   General:  Alert.   Skin:  Warm, dry, intact.    Head:  Normocephalic, atraumatic.    Neck:  Supple, trachea midline.    Eye:  Pupils are equal, round and reactive to light, extraocular movements are intact, normal conjunctiva.    Ears, nose, mouth and throat:  Oral mucosa moist, no pharyngeal erythema or exudate.    Cardiovascular:  Regular rate and rhythm, Normal peripheral perfusion, No edema.    Respiratory:  Lungs are clear to auscultation, respirations are non-labored, breath sounds are equal, Symmetrical chest wall expansion.    Gastrointestinal:  Soft, Nontender, Non distended, Normal bowel sounds, Guarding: Negative, Rebound: Negative.    Musculoskeletal:  No deformity.   Neurological:  No focal neurological deficit observed, right upper and lower facial weakness.  Facial sensation absent on right side resumes AT MIDLINE, complete paralysis right arm and right leg, Motor strength: Right lower extremity  0 /5, left lower extremity  0 /5, Sensory: reports no sensation from midline right forehead, does have sensation with needle prick, 3/5 left leg sensation, Speech: Slurred.    Psychiatric:  Cooperative, appropriate mood & affect.       Medical  Decision Making   Rationale:  Patient is nonanatomic decrease in sensation in the face.    He reports complete paralysis and complete loss of sensation in the right leg withdraws legs and needle stick and reports pain.  CT of the head negative. LP negative for infection including fungal and karina ink at this time.  last CD4 count over 800 per records pt brought with him from 9/1/17.  I do not find evidence of anatomic cause at this time.  He was very specific and clear trying to delineate time of onset the patient and mother reports that the weakness was present and associated with a headache when he awoke this morning but that it worsened at 10:30 or 11 when they came to the emergency department.  He reports he has a history of paralysis in the right side associate with headaches that this has been present for about 3 months that he states it has not been evaluated by another physician.  A code fast was not called in the ED as he reported symptom onset when awakening this morning per RN.   Documents reviewed:  Emergency department nurses' notes.   Orders  Launch Orders   Laboratory:  EtOH Level (Order): Stat collect, 9/25/2017 13:21 CDT, Blood, Lab Collect, Print Label By Order Location, 9/25/2017 13:21 CDT  Urine Drug Screen (Order): Stat collect, Urine, 9/25/2017 13:21 CDT, Nurse collect, Print Label By Order Location, 9/25/2017 13:21 CDT  Urinalysis Complete a reflex to culture (Order): Stat collect, Urine, 9/25/2017 13:21 CDT, Nurse collect, Print Label By Order Location  POC iSTAT ER Troponin request (Order): Blood, Stat collect, Collected, 9/25/2017 13:21 CDT by Julissa Jaimes Nurse collect, Print Label By Order Location  CMP (Order): Stat collect, 9/25/2017 13:20 CDT, Blood, Lab Collect, Print Label By Order Location, 9/25/2017 13:20 CDT  CBC w/ Auto Diff (Order): Stat collect, 9/25/2017 13:20 CDT, Blood, Lab Collect, Print Label By Order Location, 9/25/2017 13:20 CDT  Patient Care:  Blood Glucose  Monitoring POC (Order): 9/25/2017 13:23 CDT, 9/25/2017 13:23 CDT  Radiology:  CXR 2 Views (Order): Stat, 9/25/2017 13:21 CDT, Other (please specify), None, Stretcher, altered mental status, Rad Type  CT Head W/O Contrast (Order): Stat, 9/25/2017 13:20 CDT, Altered level of Consciousness, None, Stretcher, Rad Type, Schedule this test, [facility]  Cardiology:  EKG (Order): 9/25/2017 13:20 CDT, Stat, Once, 24, 9/25/2017 13:20 CDT, -1, -1, 9/25/2017 13:20 CDT, Launch Orders   Radiology:  CXR 2 Views (Discontinue): 9/25/2017 13:31 CDT, Launch Orders   Radiology:  CXR 1 View (Order): Stat, 9/25/2017 13:31 CDT, Other (please specify), None, Stretcher, altered mental status, Rad Type.    Electrocardiogram:  Time 9/25/2017 13:31:00, rate 66, normal sinus rhythm, No ST-T changes, no ectopy, normal ID & QRS intervals, EP Interp.    Results review:  Lab results : Lab View   9/25/2017 14:06 CDT      POC Troponin              0.02 ng/mL    9/25/2017 13:29 CDT      U pH                      7.0                             U Spec Grav               1.003                             UA Appear                 Clear                             UA Color                  Yellow                             UA Spec Grav              1.003                             UA Bili                   Negative                             UA pH                     7.0                             UA Urobilinogen           0.2                             UA Blood                  Negative                             UA Glucose                Negative                             UA Ketones                Negative                             UA Protein                Negative                             UA Nitrite                Negative                             UA Leuk Est               Negative                             UA WBC Man                None Seen                             UA RBC                    None Seen                              UA Bacteria               None Seen /HPF                             UA Squam Epithelial       None Seen  .   Radiology results:  Rad Results (ST)  < 12 hrs   Accession: [id number]  Order: CT Head W/O Contrast  Report Dt/Tm: 09/25/2017 14:24  Report:   CT Head W/O Contrast     REASON FOR EXAM:  Altered level of Consciousness     Total DLP 1077 mGy*cm     COMPARISON: None.     FINDINGS:     The ventricles and subarachnoid spaces are normal.  There is no  extra-axial fluid collection, hemorrhage, or mass effect. There is no  abnormal intra-axial attenuation.     IMPRESSION:     NEGATIVE STUDY.      .      Reexamination/ Reevaluation   Assessment: pt reports sensation improved and that his weakness is much improved, but still weak.  He is now able to speak in clear sentences.  He is able to move his right ankle is unable to move his right hand not flex the right elbow or right shoulder.      Procedure   Lumbar Puncture   Consent: Has signed consent.    Indication: Possible meningitis, Possible Sub-arachnoid hemorrhage, Neurologic work-up.    Monitoring: Cardiac, blood pressure, continuous pulse oximetry, See nurse's notes.    Patient position: Lateral decubitus.    Location: L4-L5.    Preparation: Local anesthesia: 2  ml 1% lidocaine injected locally.    Technique: 20 gauge spinal needle inserted, Specimen sent to lab.    Patient tolerated: Well.    Complications: None.    Performed by: Self.       Impression and Plan   Diagnosis   Paralysis of right upper extremity (WYK18-DT G83.21)   Paralysis of right lower extremity (PQQ64-BJ G83.11)   Headache (CDS69-QK R51)      Calls-Consults   -  Estee Arguelles.   Plan   Condition: Improved, Stable.    Disposition: Admit to Inpatient Unit.    Counseled: Patient, Family, Regarding diagnosis, Regarding diagnostic results, Regarding treatment plan, Patient indicated understanding of instructions, Family understands.    Notes: Leola GODWIN, acted solely as a  johnny for and in the presence of  who performed the service., I, Dr Garcia have read note from johnny and I agree with history and physical except as amended by me.  All information was dictated from my history and my examination of patient..

## 2022-05-05 NOTE — HISTORICAL [facility] CERNER
This is a historical note converted from [employer]. Formatting and pictures may have been removed.  Please reference [facility] for original formatting and attached multimedia. Chief Complaint  pt to ER for AMS, reports HA and rib pain x 1 week, woke up today with facial pain, AMS, EMS states he cannot talk, can only moan, +HIV, recent neurosyphilis dx, pt follows commands  Reason for Consultation  c/s by attending for weakness  History of Present Illness  31 y/o male with h/o HIV and neurosyphylis, presented to ER yesterday c/o right rib pain for 1 week.?Yesterday he was still having this pain, then?there was family argument and he was stressed and?had a migraine coming on. He tried Excedrin, then 2 hr later had to take a Maxalt and went to bed. At this point he had numbness affecting his right side.? When he awakened?he had a brief episode?unable to move any of his limbs,?which lasted less than 1 minute, then his right side?remained weak. ? He has h/o hemiplegic migraine, usually associated with right side weakness. Treatment [place] [facility] then he received Bicillin on 8/15, 8/18, and 8/22/17.? He is also currently taking hormonal therapy for gender reassignment.  ?  He has 2-3 migraines/week, daily headaches. He has never been on migraine preventive treatment; he did take Depakote in the past and tolerated (for mood).  Review of Systems  General: denies unintentional weight loss, weight gain, fatigue, decreased appetite, fever, chills or night sweats  Head: denies trauma,?tenderness,dizziness, or syncope  ENT: denies tinnitis, ear pain or hearing loss; denies throat pain, rhinitis  eyes: denies?sudden vision loss, blurring, diplopia, or photophobia  Respiratory: denies dyspnea, cough,?or hemoptysis  Cardiac: denies chest pain, dyspnea on? exertion, or peripheral edema  Gastro: denies dysphagia,?nausea, vomiting, diarrhea, constipation, or  melena  : as per HPI otherwise denies frequency, urgency, dysuria, hematuria, or incontinence  Musculoskeletal: denies joint pain, trauma, joint swelling, back pain, neck pain, or limited ROM  Neuro/Psych: as per HPI otherwise denies tremors, imbalance, seizures, memory changes, ?mood or emotional disturbances, drug or alcohol dependence  Physical Exam  Vitals & Measurements  T:?36.7? ?C ?(Oral)? TMIN:?36.5? ?C ?(Oral)? TMAX:?36.9? ?C ?(Oral)? HR:?71?(Peripheral)? RR:?18? BP:?124/89? SpO2:?95%? WT:?56.1?kg?  General: no acute distress  CV: regular rate and rhythm  Lungs: no respiratory distress  Mental Status/Orientation: AAOx4  Language : Speech clear/ coherent  Fund of knowledge: sentences clear, appropriate conversation  Commands: follows multistep commands reliably  Mood / affect: normal, calm/cooperative  CN II (optic)?: fields full to confrontation  CN II,III (optic, oculomotor): PERRL, without ptosis  CN III, IV, VI (oculomotor, trochlear, abducens)?: EOMI  CN V (trigeminal): facial tactile sense symmetrical  CN VII (facial)?: without facial asymmetry  VIII (vestibular): hearing intact to spoken voice  CN IX, X? (glossopharyngeal, vagus): soft palate elevates at midline  CN XII (hypoglossal): protrudes tongue at midline  Motor :  Right: deltoid 4/5, bicep 4/5, tricep 4/5, wrist extensors 4+/5, intrinsics 5/5  Left: deltoid 5/5, bicep 5/5, tricep 5/5, wrist extensors 5/5, intrinsics 5/5  Right: iliopsas 5/5, quadracep5/5, dorsiflexors 5/5, plantarflexors 5/5  Left:? iliopsas 5/5, quadracep5/5, dorsiflexors 5/5, plantarflexors 5/5  Sensory : normal  Cerebellar : no tremor or dysmetria  gait: not observed  Assessment/Plan  Right side weakness: hemiplegic migraine  ?  plan  consider start Depakote 250mg BID (for migraine prevention)  ?   Problem List/Past Medical History  Ongoing  Anxiety(  Confirmed  )  Depressive disorder(  Confirmed  )  Fibromyalgia  HIV (human immunodeficiency virus infection)(   Confirmed  )  Hyperlipidemia(  Confirmed  )  Insomnia(  Confirmed  )  Tobacco use disorder(  Confirmed  )  Tobacco user  Vitamin D deficiency(  Confirmed  )  Historical  Bicipital tenosynovitis(  Confirmed  )  Hair disease(  Confirmed  )  Neurosyphilis  Procedure/Surgical History  DENIES  Medications  Inpatient  acetaminophen, 1000 mg= 2 tab(s), Oral, q6hr, PRN  acetaminophen, 650 mg= 2 tab(s), Oral, q6hr, PRN  Dilaudid, 0.5 mg= 0.25 mL, IV, q4hr, PRN  Influenza Virus Vaccine, Inactivated, 0.5 mL, IM, Daily  morphine 4 mg/mL preservative-free intravenous solution, 4 mg= 1 mL, IV Push, q4hr, PRN  Norco 5 mg-325 mg oral tablet, 1 tab(s), Oral, q6hr, PRN  Phenergan, 12.5 mg= 0.5 mL, IV Push, q4hr, PRN  Zofran, 4 mg= 2 mL, IV Push, q4hr, PRN  Home  Antivert 25 mg oral tablet, 25 mg= 1 tab(s), Oral, TID, 3 refills  diclofenac sodium 75 mg oral delayed release tablet, 75 mg= 1 tab(s), Oral, BID  ergocalciferol 50,000 intl units (1.25 mg) oral capsule, [address] [employer] unit= 1 cap(s), Oral, qMonth, 3 refills  ESTRADIOL 2MG TABLETS, 2 mg= 1 tab(s), Oral, Daily,? ?Investigating  Flonase 50 mcg/inh nasal spray, 2 spray(s), Nasal, Daily  fluvoxamine 50 mg oral tablet, 50 mg= 1 tab(s), Oral, Once a day (at bedtime)  GABAPENTIN 300MG CAPSULES, 300 mg= 1 cap(s), Oral, TID  Genvoya oral tablet, 1 tab(s), Oral, Daily, 1 refills  loratadine 10 mg oral capsule, 10 mg= 1 cap(s), Oral, Daily  Maxalt 10 mg oral tablet, 10 mg= 1 tab(s), Oral, Daily, PRN, 1 refills  SPIRONOLACTONE 50MG TABLETS, 50 mg= 1 tab(s), Oral, Daily  Trokendi Xr 25 Mg Capsule, 25 mg= 1 cap(s), Oral, qPM,? ?Not taking  Allergies  No Known Allergies  Social History  Alcohol - Low Risk, 09/25/2017  Current, Beer, 1-2 times per month, Previous treatment: None. Alcohol use interferes with work or home: No. Drinks more than intended: No. Others hurt by drinking: No. Ready to change: No. Household alcohol concerns: No.  Employment/School -  09/25/2017  Unemployed, Highest education level: High school.  Home/Environment - 09/25/2017  Lives with Mother. Living situation: Home/Independent. Alcohol abuse in household: No. Substance abuse in household: No. Smoker in household: Yes. Injuries/Abuse/Neglect in household: No. Feels unsafe at home: No.  Nutrition/Health - 09/25/2017  Regular  Substance Abuse - Denies Substance Abuse, 09/25/2017  Current, Marijuana, 3-5 times per week  Tobacco - High Risk, 09/25/2017  Current every day smoker, Cigarettes, 2 per day. 15 year(s). Started age 15.0 Years. Previous treatment: Medications. Ready to change: No. Household tobacco concerns: No.  Current every day smoker, Cigarettes, 3 per day. Ready to change: Yes.  Family History  Alcoholism.: Brother.  Congestive heart disease.: Mother and Father.  Depression.: Brother.  Diabetes mellitus type 2: Mother and Father.  Drug addiction.: Brother.  Hyperglycemia.: Father.  Hypertension.: Mother and Father.  Hyperthyroidism.: Mother.  Lab Results  Test Name Test Result Date/Time Comments   Sodium Lvl 134 mmol/L (Low) 09/25/2017 14:00 CDT    Potassium Lvl 4.6 mmol/L 09/25/2017 14:00 CDT    Chloride 103 mmol/L 09/25/2017 14:00 CDT    CO2 24.0 mmol/L 09/25/2017 14:00 CDT    Calcium Lvl 9.7 mg/dL 09/25/2017 14:00 CDT    Glucose Lvl 98 mg/dL 09/25/2017 14:00 CDT    BUN 8.0 mg/dL 09/25/2017 14:00 CDT    Creatinine 1.10 mg/dL 09/25/2017 14:00 CDT    AST 17 unit/L 09/25/2017 14:00 CDT    ALT 27 unit/L 09/25/2017 14:00 CDT    Chol 145 mg/dL 09/15/2017 12:38 CDT    HDL 39 mg/dL (Low) 09/15/2017 12:38 CDT    Trig 256 mg/dL (High) 09/15/2017 12:38 CDT    LDL 55 mg/dL 09/15/2017 12:38 CDT    WBC 9.6 x10(3)/mcL 09/25/2017 14:00 CDT    RBC 4.78 x10(6)/mcL 09/25/2017 14:00 CDT    Hgb 16.2 gm/dL 09/25/2017 14:00 CDT    Hct 45.8 % 09/25/2017 14:00 CDT    Platelet 206 x10(3)/mcL 09/25/2017 14:00 CDT    Ethanol Lvl 4.0 mg/dL (High) 09/25/2017 14:00 CDT Reference Range - Ethanol:<br/> &nbsp;  &nbsp; Normal (Below sensitivity range): &lt;3 mg/dL or &lt;0.003 gm/dL (%)<br/> &nbsp; &nbsp; Legally Intoxicated: &nbsp; &nbsp; &nbsp; &nbsp; &nbsp; &nbsp; &nbsp; &nbsp; &nbsp; &nbsp; &nbsp; &nbsp;80 mg/dL or 0.08 gm/dL (%)<br/>Alcohol assay reported in mg/dL.<br/>This assay is performed for medical purposes only.   U Amph Scr NEG 09/25/2017 13:29 CDT Cut off concentration: &nbsp;Amphetamines - 1000 ng/ml   U Yessenia Scr NEG 09/25/2017 13:29 CDT Cut off concentration - Barbiturates - 300 ng/ml   U Benzodia Scr NEG 09/25/2017 13:29 CDT Cut off concentration - Benzodiazepine - 200 ng/ml   U Cannab Scr POS 09/25/2017 13:29 CDT Cut off concentration: &nbsp;Cannabinoids - 50 ng/ml   U Cocaine Scr NEG 09/25/2017 13:29 CDT Cut off concentration: &nbsp;Cocaine - 300 ng/ml   U Opiate Scr NEG 09/25/2017 13:29 CDT Cut off concentration: &nbsp;Opiates - 300 ng/ml   U Phencyclidine Scr NEG 09/25/2017 13:29 CDT *Cut off concentration: &nbsp;Phencyclidine - 25 ng/mL<br/>=========================================================================<br/>Cut off concentrations are recommended by MICHELLE. A negative result is reported if values fall below cut off.<br/><br/>Specimen submitted for drug analysis and tested for pH and specific gravity in order to evaluate sample integrity. Suspect tampering if spec gravity is &lt;1.003 and/or pH is not within range of 4.5-8.0.<br/>False negative may result from substances such as bleach added to urine.<br/>False positive may result from the presence of a substance with similar chemical structure to the drug or its metabolite.<br/>**************************************************************************************<br/>Results are reported as unconfirmed.<br/>Unconfirmed screening results are to be used only for medical purposes (treatment).<br/>Confirmatory testing is available upon request.   Diagnostic Results  CT Head  ?  The ventricles and subarachnoid spaces are normal. ?There is  no  extra-axial fluid collection, hemorrhage, or mass effect. There is no  abnormal intra-axial attenuation.  ?  IMPRESSION:  NEGATIVE STUDY.  ?   MRI Brain  IMPRESSION:  1. Subtle focal abnormal T2 signal in the periventricular white matter  of both occipital lobes dorsal to the occipital horns of both lateral  ventricles. These areas are nonspecific and are unchanged from the  previous exam. Differential considerations include focal gliosis and  and migraine headaches occurring at the time of exam. Early, subtle  white matter changes associated with HIV disease including HIV  associated cerebral vasculopathy. Very early changes from HIV  associated encephalopathy is possible, but there is no significant  interval change from the comparison exam to suggest any progression.  Other entities associated with HIV disease that are much less likely  given no interval worsening since the previous exam and include  opportunistic infections and various neoplastic processes. Otherwise,  no acute intracranial process is identified.?  2. Scattered chronic sinus disease.  ?  [1]     [1]?MRI Brain W/O Contrast; Ольга TAO, Dave KIM 09/25/2017 20:03 CDT

## 2022-05-24 ENCOUNTER — OFFICE VISIT (OUTPATIENT)
Dept: INFECTIOUS DISEASES | Facility: CLINIC | Age: 37
End: 2022-05-24
Payer: MEDICAID

## 2022-05-24 ENCOUNTER — LAB VISIT (OUTPATIENT)
Dept: LAB | Facility: HOSPITAL | Age: 37
End: 2022-05-24
Attending: NURSE PRACTITIONER
Payer: MEDICAID

## 2022-05-24 VITALS
WEIGHT: 119.63 LBS | BODY MASS INDEX: 18.13 KG/M2 | HEIGHT: 68 IN | DIASTOLIC BLOOD PRESSURE: 83 MMHG | SYSTOLIC BLOOD PRESSURE: 125 MMHG | TEMPERATURE: 98 F | HEART RATE: 73 BPM | RESPIRATION RATE: 16 BRPM

## 2022-05-24 DIAGNOSIS — A53.9 SYPHILIS (ACQUIRED): ICD-10-CM

## 2022-05-24 DIAGNOSIS — B20 HIV DISEASE: Primary | ICD-10-CM

## 2022-05-24 DIAGNOSIS — Z00.00 ROUTINE MEDICAL EXAM: Primary | ICD-10-CM

## 2022-05-24 DIAGNOSIS — B20 HIV INFECTION, UNSPECIFIED SYMPTOM STATUS: ICD-10-CM

## 2022-05-24 LAB
ALBUMIN SERPL-MCNC: 4.6 GM/DL (ref 3.5–5)
ALBUMIN/GLOB SERPL: 1.3 RATIO (ref 1.1–2)
ALP SERPL-CCNC: 111 UNIT/L (ref 40–150)
ALT SERPL-CCNC: 16 UNIT/L (ref 0–55)
AST SERPL-CCNC: 18 UNIT/L (ref 5–34)
BASOPHILS # BLD AUTO: 0.07 X10(3)/MCL (ref 0–0.2)
BASOPHILS NFR BLD AUTO: 0.8 %
BILIRUBIN DIRECT+TOT PNL SERPL-MCNC: 1.2 MG/DL
BUN SERPL-MCNC: 6.3 MG/DL (ref 8.9–20.6)
CALCIUM SERPL-MCNC: 10.2 MG/DL (ref 8.4–10.2)
CHLORIDE SERPL-SCNC: 101 MMOL/L (ref 98–107)
CO2 SERPL-SCNC: 30 MMOL/L (ref 22–29)
CREAT SERPL-MCNC: 1 MG/DL (ref 0.73–1.18)
EOSINOPHIL # BLD AUTO: 0.11 X10(3)/MCL (ref 0–0.9)
EOSINOPHIL NFR BLD AUTO: 1.2 %
ERYTHROCYTE [DISTWIDTH] IN BLOOD BY AUTOMATED COUNT: 13 % (ref 11.5–17)
GLOBULIN SER-MCNC: 3.6 GM/DL (ref 2.4–3.5)
GLUCOSE SERPL-MCNC: 107 MG/DL (ref 74–100)
HCT VFR BLD AUTO: 45.4 % (ref 42–52)
HGB BLD-MCNC: 15.4 GM/DL (ref 14–18)
IMM GRANULOCYTES # BLD AUTO: 0.02 X10(3)/MCL (ref 0–0.02)
IMM GRANULOCYTES NFR BLD AUTO: 0.2 % (ref 0–0.43)
LYMPHOCYTES # BLD AUTO: 2.27 X10(3)/MCL (ref 0.6–4.6)
LYMPHOCYTES NFR BLD AUTO: 25.1 %
MCH RBC QN AUTO: 31.9 PG (ref 27–31)
MCHC RBC AUTO-ENTMCNC: 33.9 MG/DL (ref 33–36)
MCV RBC AUTO: 94 FL (ref 80–94)
MONOCYTES # BLD AUTO: 0.61 X10(3)/MCL (ref 0.1–1.3)
MONOCYTES NFR BLD AUTO: 6.7 %
NEUTROPHILS # BLD AUTO: 6 X10(3)/MCL (ref 2.1–9.2)
NEUTROPHILS NFR BLD AUTO: 66 %
NRBC BLD AUTO-RTO: 0 %
PLATELET # BLD AUTO: 261 X10(3)/MCL (ref 130–400)
PMV BLD AUTO: 10.3 FL (ref 9.4–12.4)
POTASSIUM SERPL-SCNC: 3.5 MMOL/L (ref 3.5–5.1)
PROT SERPL-MCNC: 8.2 GM/DL (ref 6.4–8.3)
RBC # BLD AUTO: 4.83 X10(6)/MCL (ref 4.7–6.1)
SODIUM SERPL-SCNC: 140 MMOL/L (ref 136–145)
T PALLIDUM AB SER QL: ABNORMAL
T PALLIDUM AB SER QL: REACTIVE
WBC # SPEC AUTO: 9 X10(3)/MCL (ref 4.5–11.5)

## 2022-05-24 PROCEDURE — 36415 COLL VENOUS BLD VENIPUNCTURE: CPT

## 2022-05-24 PROCEDURE — 85025 COMPLETE CBC W/AUTO DIFF WBC: CPT

## 2022-05-24 PROCEDURE — 1160F PR REVIEW ALL MEDS BY PRESCRIBER/CLIN PHARMACIST DOCUMENTED: ICD-10-PCS | Mod: CPTII,,, | Performed by: NURSE PRACTITIONER

## 2022-05-24 PROCEDURE — 1159F MED LIST DOCD IN RCRD: CPT | Mod: CPTII,,, | Performed by: NURSE PRACTITIONER

## 2022-05-24 PROCEDURE — 86592 SYPHILIS TEST NON-TREP QUAL: CPT

## 2022-05-24 PROCEDURE — 1160F RVW MEDS BY RX/DR IN RCRD: CPT | Mod: CPTII,,, | Performed by: NURSE PRACTITIONER

## 2022-05-24 PROCEDURE — 3079F PR MOST RECENT DIASTOLIC BLOOD PRESSURE 80-89 MM HG: ICD-10-PCS | Mod: CPTII,,, | Performed by: NURSE PRACTITIONER

## 2022-05-24 PROCEDURE — 86780 TREPONEMA PALLIDUM: CPT

## 2022-05-24 PROCEDURE — 99215 OFFICE O/P EST HI 40 MIN: CPT | Mod: S$PBB,,, | Performed by: NURSE PRACTITIONER

## 2022-05-24 PROCEDURE — 86361 T CELL ABSOLUTE COUNT: CPT

## 2022-05-24 PROCEDURE — 3008F PR BODY MASS INDEX (BMI) DOCUMENTED: ICD-10-PCS | Mod: CPTII,,, | Performed by: NURSE PRACTITIONER

## 2022-05-24 PROCEDURE — 87536 HIV-1 QUANT&REVRSE TRNSCRPJ: CPT

## 2022-05-24 PROCEDURE — 80053 COMPREHEN METABOLIC PANEL: CPT

## 2022-05-24 PROCEDURE — 1159F PR MEDICATION LIST DOCUMENTED IN MEDICAL RECORD: ICD-10-PCS | Mod: CPTII,,, | Performed by: NURSE PRACTITIONER

## 2022-05-24 PROCEDURE — 3008F BODY MASS INDEX DOCD: CPT | Mod: CPTII,,, | Performed by: NURSE PRACTITIONER

## 2022-05-24 PROCEDURE — 3079F DIAST BP 80-89 MM HG: CPT | Mod: CPTII,,, | Performed by: NURSE PRACTITIONER

## 2022-05-24 PROCEDURE — 3074F SYST BP LT 130 MM HG: CPT | Mod: CPTII,,, | Performed by: NURSE PRACTITIONER

## 2022-05-24 PROCEDURE — 99215 PR OFFICE/OUTPT VISIT, EST, LEVL V, 40-54 MIN: ICD-10-PCS | Mod: S$PBB,,, | Performed by: NURSE PRACTITIONER

## 2022-05-24 PROCEDURE — 3074F PR MOST RECENT SYSTOLIC BLOOD PRESSURE < 130 MM HG: ICD-10-PCS | Mod: CPTII,,, | Performed by: NURSE PRACTITIONER

## 2022-05-24 PROCEDURE — 99214 OFFICE O/P EST MOD 30 MIN: CPT | Mod: PBBFAC | Performed by: NURSE PRACTITIONER

## 2022-05-24 RX ORDER — VENLAFAXINE HYDROCHLORIDE 150 MG/1
150 CAPSULE, EXTENDED RELEASE ORAL DAILY
COMMUNITY
Start: 2022-04-28 | End: 2022-11-04

## 2022-05-24 RX ORDER — MEGESTROL ACETATE 40 MG/ML
400 SUSPENSION ORAL
COMMUNITY
End: 2023-08-15

## 2022-05-24 RX ORDER — FLUTICASONE PROPIONATE 50 MCG
1 SPRAY, SUSPENSION (ML) NASAL 2 TIMES DAILY
COMMUNITY
Start: 2022-04-28 | End: 2022-08-02 | Stop reason: SDUPTHER

## 2022-05-24 RX ORDER — ONDANSETRON 8 MG/1
8 TABLET, ORALLY DISINTEGRATING ORAL
COMMUNITY
Start: 2021-11-23

## 2022-05-24 RX ORDER — BICTEGRAVIR SODIUM, EMTRICITABINE, AND TENOFOVIR ALAFENAMIDE FUMARATE 50; 200; 25 MG/1; MG/1; MG/1
1 TABLET ORAL DAILY
COMMUNITY
Start: 2022-05-07 | End: 2022-05-24 | Stop reason: SDUPTHER

## 2022-05-24 RX ORDER — BICTEGRAVIR SODIUM, EMTRICITABINE, AND TENOFOVIR ALAFENAMIDE FUMARATE 50; 200; 25 MG/1; MG/1; MG/1
1 TABLET ORAL DAILY
Qty: 30 TABLET | Refills: 3 | Status: SHIPPED | OUTPATIENT
Start: 2022-05-24 | End: 2022-08-04 | Stop reason: SDUPTHER

## 2022-05-24 RX ORDER — ERGOCALCIFEROL 1.25 MG/1
50000 CAPSULE ORAL
COMMUNITY
Start: 2022-03-23 | End: 2022-08-02 | Stop reason: SDUPTHER

## 2022-05-24 RX ORDER — PANTOPRAZOLE SODIUM 40 MG/1
40 TABLET, DELAYED RELEASE ORAL DAILY
COMMUNITY
Start: 2022-04-28

## 2022-05-24 RX ORDER — CETIRIZINE HYDROCHLORIDE 10 MG/1
10 TABLET ORAL DAILY
COMMUNITY
Start: 2022-04-28

## 2022-05-24 RX ORDER — ALBUTEROL SULFATE 90 UG/1
2 AEROSOL, METERED RESPIRATORY (INHALATION)
COMMUNITY

## 2022-05-24 RX ORDER — ARIPIPRAZOLE 5 MG/1
TABLET ORAL
COMMUNITY
Start: 2022-04-28

## 2022-05-24 RX ORDER — GABAPENTIN 600 MG/1
600 TABLET ORAL 2 TIMES DAILY PRN
COMMUNITY
Start: 2022-04-28 | End: 2022-10-06

## 2022-05-24 NOTE — PROGRESS NOTES
Subjective:       Patient ID: Ezra Kennedy is a 37 y.o. male.    Chief Complaint: b20 f/u     5/24/22  Jaziel is a 38 yo transgender male to female presenting today for HIV f/u visit.  Not currently on hormone therapy but plans to establish care with Aysha Walker at [facility].  Reports 100% adherent to Biktarvy, tolerates well.  Labs collected this am, last resulted labs 11/21 VL <20, CD4 875.  Treated for syphilis 8/21 with baseline titer of 128 dils, decreased to 64 dils 11/21.  Collected titer today, results pending.  Tells me that they are monogamous with Shant & declines need for repeat STI screening today.  Recently treated for STI along with Shant for known exposure.  Will screen next visit.  Doing ok overall, but did just lose a friend to likely drug overdose last week.  Mourning the loss.  All questions answered & concerns addressed.     11/17/21  Jaziel is a 37 yo transgender male to female presenting today for HIV f/u visit.  She reports adherence to Genvoya, but states that she did have a 1 week treatment interruption about 2 months ago due to trouble getting mom's house to  medication.  Partner Shant takes Biktarvy & being on same medication would be helpful.  Also, diagnosed with fibromyalgia and limiting any unnecessary medication is appreciated.  Will revise ART to Biktarvy 1 po daily, voiced appreciation.  Last labs 8/3/21 VL <20, Cd4 617.  Completed treatment for syphilis with high titer of 128 dils 8/2021.  Will repeat titer today.  Rash resolved.  Appreciates flu vax today.  Remains in care with PCP MIRANDA Clement NP.  All questions answered & concerns addressed.     8/17/21  Jaziel is a 37 yo transgender male to female presenting today for f/u visit.  They report 100% adherent to Genvoya, tolerating well with viral suppression.  Labs collected 8/3/21 VL <20, Cd4 617.  Syphilis resulted positive 8/3/21 with a titer of 128 dils.  They are completing 3rd dose of Bicillin 2.4 mil units  weekly x 3 today.  Partner Shant Dey completed single dose of Bicillin 2.4 mil units as clinically indicated.  CT/GC screenings all resulted negative.  Anal pap 8/3/21 NIL.  Scheduled for f/u with PCP in 2 weeks, MIRANDA Clement NP.  Will be starting COVID vaccination later this week.  Skin lesions fading away.    Review of Systems   Constitutional: Negative.    HENT: Negative.    Respiratory: Negative.    Cardiovascular: Negative.    Gastrointestinal: Negative.    Genitourinary: Negative.    Integumentary:  Negative.   Neurological: Negative.    Hematological: Negative.    Psychiatric/Behavioral: Negative.          Objective:      Physical Exam  Vitals reviewed.   Constitutional:       General: He is not in acute distress.     Appearance: Normal appearance. He is not toxic-appearing.   HENT:      Mouth/Throat:      Mouth: Mucous membranes are moist.      Pharynx: Oropharynx is clear.   Eyes:      Conjunctiva/sclera: Conjunctivae normal.   Cardiovascular:      Rate and Rhythm: Normal rate and regular rhythm.   Pulmonary:      Effort: Pulmonary effort is normal. No respiratory distress.      Breath sounds: Normal breath sounds.   Abdominal:      General: Abdomen is flat. Bowel sounds are normal.      Palpations: Abdomen is soft.   Musculoskeletal:         General: Normal range of motion.      Cervical back: Normal range of motion.   Lymphadenopathy:      Cervical: No cervical adenopathy.   Skin:     General: Skin is warm and dry.   Neurological:      General: No focal deficit present.      Mental Status: He is alert and oriented to person, place, and time. Mental status is at baseline.   Psychiatric:         Mood and Affect: Mood normal.         Behavior: Behavior normal.         Assessment:       Problem List Items Addressed This Visit        ID    HIV disease - Primary    Relevant Medications    BIKTARVY -25 mg (25 kg or greater)    Other Relevant Orders    HIV-1 RNA, Quantitative, PCR with Reflex to Genotype     CD4 T-Brighton Cells    Urinalysis    Vitamin D    TSH    Hemoglobin A1C    Hepatitis C Antibody    Lipid Panel    Chlamydia/GC, PCR    Comprehensive Metabolic Panel    CBC Auto Differential    Quantiferon Gold TB    Syphilis (acquired)    Relevant Orders    SYPHILIS ANTIBODY (WITH REFLEX RPR)          Plan:       HIV disease  -     BIKTARVY -25 mg (25 kg or greater); Take 1 tablet by mouth once daily.  Dispense: 30 tablet; Refill: 3  -     HIV-1 RNA, Quantitative, PCR with Reflex to Genotype; Future; Expected date: 08/24/2022  -     CD4 T-Brighton Cells; Future; Expected date: 08/24/2022  -     Urinalysis; Future; Expected date: 08/24/2022  -     Vitamin D; Future; Expected date: 08/24/2022  -     TSH; Future; Expected date: 08/24/2022  -     Hemoglobin A1C; Future; Expected date: 08/24/2022  -     Hepatitis C Antibody; Future; Expected date: 08/24/2022  -     Lipid Panel; Future; Expected date: 08/24/2022  -     Chlamydia/GC, PCR; Future; Expected date: 08/24/2022  -     Comprehensive Metabolic Panel; Future; Expected date: 08/24/2022  -     CBC Auto Differential; Future; Expected date: 08/24/2022  -     Quantiferon Gold TB; Future; Expected date: 05/24/2022  Adherence and sexual health counseling done.  Use condoms for all sexual encounters.  Blood precautions.   Continue Biktarvy as directed.  Labs prior to next visit.   RTC 3 months with Alanna.    HIV Wellness:  Anal pap: 8/21 NIL  Oral CT/GC: 8/21 neg  Anal CT/GC: 8/21 neg  Urine CT/GC: 8/21 neg  RPR: 11/21 64 dils, 5/22  Ophth: will schedule appt (5/22)    Syphilis (acquired)  -     SYPHILIS ANTIBODY (WITH REFLEX RPR); Future; Expected date: 08/24/2022  titer 128 dils 8/3/21  treated with 7.2 mil units Bicillin IM, completed 8/17/21  partner treated as well  sexual health counseling done  will repeat titer quarterly for 4 fold decrease over 1 year, collect today

## 2022-05-25 LAB
AGE: >18
CD3+CD4+ CELLS # BLD: 25.1 % (ref 28–48)
CD3+CD4+ CELLS # SPEC: 537.64 UNIT/L (ref 589–1505)
CD3+CD4+ CELLS NFR BLD: 23.8 %
LYMPHOCYTES # BLD AUTO: 2259 X10(3)/MCL (ref 1260–5520)
LYMPHOMA - T-CELL MARKERS SPEC-IMP: ABNORMAL
WBC # BLD AUTO: 9000 /MM3 (ref 4500–11500)

## 2022-05-26 LAB — HIV1 RNA # PLAS NAA DL=20: NORMAL COPIES/ML

## 2022-05-27 LAB
RPR SER QL: ABNORMAL
RPR SER QL: REACTIVE
RPR SER-TITR: ABNORMAL {TITER}

## 2022-07-20 NOTE — PROGRESS NOTES
[facility] Neurology follow-up Office Visit Note    Last Visit Date: 2/2/2021  Current Visit Date:  07/21/2022    Chief Complaint:     Chief Complaint   Patient presents with    Headache     States continue to have headache but miss doses of meds       History of Present Illness:      This is 37 y.o. male with history of Transgendered M with PMHX of HIV on HAART, Neurosyphilis, GERD, Anxiety, with chronic migraine w/o aura, intractable, failed Elavil and TPM. Will need to r/o underlying CNS space occupying lesions, such as CNS Lymphoma.  During last visit, Fremanezumab 225 mg once a month was started.  Patient was still pending MRI brain. Today, pt states that last Fremanezumab injection was 3 months ago. Injections were helping but was unable to take them consistently. Migraines seem to have gotten slightly less frequent but are more intense with longer duration. They are very debilitating and associated with n/v, photophobia, and phonophobia. Aura has become more frequent; appears to have tunnel vision/blurry peripheral vision prior to onset. Having approximately 20-25 headache days per month.     Age of Onset - childhood    Semiology - Right tempora-occipital, stabbing, throbbing, 10/10, w/ nausea/vomiting, w/ photophobia and phonophobia, 3-4 days. States that he has generalized fatigue. Aura with tunnel vision/blurry vision.    Frequency - 20-25 migraine headache days at night.    Exacerbating Factors - heat; TMJ pain, eating habits    Risk Factors -  - Family history of headache disorder? mom with headache d/o.  - History of Head Trauma? Denied  - History of CNS infection? Neurosyphilis, treated in [id number]  - History of underlying mood disorder? Anxiety d/o, Bipolar d/o.  - Illicit drug use? Smokes Marijuana for pain  - Tobacco use? 6-7 cigs daily  - History of sleep disorder? not sleeping well at night      Medications:     Current ppx meds -  Effexor 150 mg daily   Ajovy 225mg/1.5mL once per month (2/2/2021 -  present) - effective but pt has been inconsistent with taking  Doxepin 10mg    Current abortive meds -  Tylenol - less than 3 days out of the week  Ibuprofen 800mg BID PRN - less than 3 days out of the week    Prior ppx meds -  OXC 300mg BID (7/2/2019 - 4/22/2020)  TPM 50mg BID (6/19/2020 - 10/28/2020) - ineffective. c/o drowsiness and memory loss.  Neurontin 600mg TID - ineffective  Elavil 50mg qhs (10/28/2020 - present)    Prior abortive meds -   Toradol - ineffective   Sumatriptan 100mg daily PRN (8/26/2020- present) - ineffective.  Promethazine 12.5mg q6h PRN - ran out and not taking  Diclofenac DR 75mg BID PRN (8/26/2020 - present)- ineffective     Devices:     - VNS:  - TNS  - TMS:     Procedures:     - Botox:  - PSG block:   - Occipital nerve block:     Labs:     Results for orders placed or performed in visit on 05/24/22   HIV-1 RNA, Quantitative, PCR with Reflex to Genotype   Result Value Ref Range    HIV-1 RNA Detect/Quant, P Undetected Undetected copies/mL   CD4 Lymphocytes   Result Value Ref Range    Patient Age >18     WBC Absolute 9,000 4,500 - 11,500 /mm3    Lymph Percent 25.1 (L) 28 - 48 %    Lymph Absolute 2,259 1,260 - 5,520 x10(3)/mcL    CD4 Pct 23.8 %    CD4 Absolute 537.642 (L) 589 - 1,505 unit/L    T Cell Interp       Normal total lymphocyte absolute count.  Decreased CD4+ T helper cell absolute count.  Wilmar Chávez MD     SYPHILIS ANTIBODY (WITH REFLEX RPR)   Result Value Ref Range    Syphilis Antibody Reactive (A) Nonreactive, Equivocal    Syphilis Antibody #     Comprehensive Metabolic Panel   Result Value Ref Range    Sodium Level 140 136 - 145 mmol/L    Potassium Level 3.5 3.5 - 5.1 mmol/L    Chloride 101 98 - 107 mmol/L    Carbon Dioxide 30 (H) 22 - 29 mmol/L    Glucose Level 107 (H) 74 - 100 mg/dL    Blood Urea Nitrogen 6.3 (L) 8.9 - 20.6 mg/dL    Creatinine 1.00 0.73 - 1.18 mg/dL    Calcium Level Total 10.2 8.4 - 10.2 mg/dL    Protein Total 8.2 6.4 - 8.3 gm/dL    Albumin Level 4.6  3.5 - 5.0 gm/dL    Globulin 3.6 (H) 2.4 - 3.5 gm/dL    Albumin/Globulin Ratio 1.3 1.1 - 2.0 ratio    Bilirubin Total 1.2 <=1.5 mg/dL    Alkaline Phosphatase 111 40 - 150 unit/L    Alanine Aminotransferase 16 0 - 55 unit/L    Aspartate Aminotransferase 18 5 - 34 unit/L    Estimated GFR-Non  >60 mls/min/1.73/m2   CBC with Differential   Result Value Ref Range    WBC 9.0 4.5 - 11.5 x10(3)/mcL    RBC 4.83 4.70 - 6.10 x10(6)/mcL    Hgb 15.4 14.0 - 18.0 gm/dL    Hct 45.4 42.0 - 52.0 %    MCV 94.0 80.0 - 94.0 fL    MCH 31.9 (H) 27.0 - 31.0 pg    MCHC 33.9 33.0 - 36.0 mg/dL    RDW 13.0 11.5 - 17.0 %    Platelet 261 130 - 400 x10(3)/mcL    MPV 10.3 9.4 - 12.4 fL    Neut % 66.0 %    Lymph % 25.1 %    Mono % 6.7 %    Eos % 1.2 %    Basophil % 0.8 %    Lymph # 2.27 0.6 - 4.6 x10(3)/mcL    Neut # 6.0 2.1 - 9.2 x10(3)/mcL    Mono # 0.61 0.1 - 1.3 x10(3)/mcL    Eos # 0.11 0 - 0.9 x10(3)/mcL    Baso # 0.07 0 - 0.2 x10(3)/mcL    IG# 0.02 (H) 0 - 0.0155 x10(3)/mcL    IG% 0.2 0 - 0.43 %    NRBC% 0.0 %   RPR   Result Value Ref Range    RPR Reactive (A) Non-Reactive    RPR Titer 16 dils (A) (none)    RPR #         Studies:     - MRI Brain +/- Ganesh 2020 - pending  - MRI Brain w/o Ganesh 9/22/2017 - I have reviewed the study independently and with the patient. Scattered migrainous changes noted.  - MRI Brain +/- Ganesh 5/30/2017 - I have reviewed the study independently and with the patient. Scattered migrainous changes noted.    Review of Systems:     Review of Systems   All other systems reviewed and are negative.      Physical Exams:     Vitals:    07/21/22 0941   BP: 109/76   Pulse: 79   Resp: 20   Temp: 98.2 °F (36.8 °C)       Physical Exam  Vitals and nursing note reviewed.   Constitutional:       Appearance: Normal appearance. He is normal weight.   HENT:      Head: Normocephalic and atraumatic.      Nose: Nose normal.      Mouth/Throat:      Mouth: Mucous membranes are moist.      Pharynx: Oropharynx is clear.   Eyes:       Conjunctiva/sclera: Conjunctivae normal.   Cardiovascular:      Rate and Rhythm: Normal rate and regular rhythm.      Pulses: Normal pulses.      Heart sounds: Normal heart sounds.   Pulmonary:      Effort: Pulmonary effort is normal.      Breath sounds: Normal breath sounds.   Abdominal:      General: Abdomen is flat.      Palpations: Abdomen is soft.   Musculoskeletal:         General: Normal range of motion.      Cervical back: Normal range of motion.   Neurological:      Mental Status: He is alert.   Psychiatric:         Mood and Affect: Mood normal.         Comprehensive Neurological Exam:  Mental Status- Alert and Oriented to time, self, place, Speech is fluent, with intact repetition, naming, reading, and comprehension., No Dysarthria.  CN II-XII - CN I Deferred, JANA, Fundus sharp, non-pallor, sensitivity to light left eye, no RAPD, VA grossly normal to finger counting at 6ft, No ptosis b/l, EOMI w/o nystagmus, LT/PP symmetric, intact in CN V1-V3 distribution b/l, masseter symmetric b/l, T/U midline, Shoulder shrug symmetric b/l, Hearing grossly intact b/l, VFFC, Face Symmetric.  Motor - Tone and Bulk nml throughout, No involuntary movements, 5/5 to confrontation throughout, FFM nml b/l, No pronator drift.  Sensory - LT/PP/Temp/Proprioception/Vibration symmetric intact throughout.  Reflexes - 2+ Throughout, Babinski negative.  Cerebellar Exam - FNF wnl b/l no intention tremor.  Romberg - negative.  Gait - Normal, Trouble with Tandem gait    Assessment:     This is 37 y.o. male with history of Transgendered M with PMHX of HIV on HAART, Neurosyphilis, GERD, Anxiety, with chronic migraine w/o aura, intractable, failed Elavil and TPM. Will need to r/o underlying CNS space occupying lesions, such as CNS Lymphoma.     Problem List Items Addressed This Visit        Neuro    Common migraine with intractable migraine - Primary       Psychiatric    Mixed anxiety depressive disorder          Plan:     [] Rizatriptan  5mg BID PRN  [] Restart Ajovy 225mg/1.5mL once a month    Patient did leave prior to being evaluated.   RTC 3 months.    Headache education provided: good sleep hygiene and 7 hours of sleep per night, stress management, medication overuse education provided. Using more 3 OTC per week may worsen headaches, high intensity interval training has shown to reduce headache frequency. Low carb, high protein has shown to reduce headache frequency. Patient is instructed in keep headache diary.     I have explained the treatment plan, diagnosis, and prognosis to patient. All questions are answered to the best of my knowledge.     Face to face time 30 minutes, including documentation, chart review, counseling, education, review of test results, relevant medical records, and coordination of care.     Liz Duncan MD  PGY-3, Internal Medicine      Jennifer Hodges MD   General Neurology  07/20/2022

## 2022-07-21 ENCOUNTER — OFFICE VISIT (OUTPATIENT)
Dept: NEUROLOGY | Facility: CLINIC | Age: 37
End: 2022-07-21
Payer: MEDICAID

## 2022-07-21 VITALS
SYSTOLIC BLOOD PRESSURE: 109 MMHG | DIASTOLIC BLOOD PRESSURE: 76 MMHG | TEMPERATURE: 98 F | OXYGEN SATURATION: 97 % | WEIGHT: 120.13 LBS | BODY MASS INDEX: 18.21 KG/M2 | HEIGHT: 68 IN | RESPIRATION RATE: 20 BRPM | HEART RATE: 79 BPM

## 2022-07-21 DIAGNOSIS — F41.8 MIXED ANXIETY DEPRESSIVE DISORDER: ICD-10-CM

## 2022-07-21 DIAGNOSIS — G43.711 CHRONIC MIGRAINE WITHOUT AURA, INTRACTABLE, WITH STATUS MIGRAINOSUS: Primary | ICD-10-CM

## 2022-07-21 PROBLEM — G43.019 COMMON MIGRAINE WITH INTRACTABLE MIGRAINE: Status: ACTIVE | Noted: 2022-07-21

## 2022-07-21 PROCEDURE — 99213 OFFICE O/P EST LOW 20 MIN: CPT | Mod: PBBFAC | Performed by: PSYCHIATRY & NEUROLOGY

## 2022-07-21 PROCEDURE — 3074F SYST BP LT 130 MM HG: CPT | Mod: CPTII,,, | Performed by: PSYCHIATRY & NEUROLOGY

## 2022-07-21 PROCEDURE — 1159F PR MEDICATION LIST DOCUMENTED IN MEDICAL RECORD: ICD-10-PCS | Mod: CPTII,,, | Performed by: PSYCHIATRY & NEUROLOGY

## 2022-07-21 PROCEDURE — 3074F PR MOST RECENT SYSTOLIC BLOOD PRESSURE < 130 MM HG: ICD-10-PCS | Mod: CPTII,,, | Performed by: PSYCHIATRY & NEUROLOGY

## 2022-07-21 PROCEDURE — 3008F PR BODY MASS INDEX (BMI) DOCUMENTED: ICD-10-PCS | Mod: CPTII,,, | Performed by: PSYCHIATRY & NEUROLOGY

## 2022-07-21 PROCEDURE — 99214 OFFICE O/P EST MOD 30 MIN: CPT | Mod: S$PBB,,, | Performed by: PSYCHIATRY & NEUROLOGY

## 2022-07-21 PROCEDURE — 3008F BODY MASS INDEX DOCD: CPT | Mod: CPTII,,, | Performed by: PSYCHIATRY & NEUROLOGY

## 2022-07-21 PROCEDURE — 99214 PR OFFICE/OUTPT VISIT, EST, LEVL IV, 30-39 MIN: ICD-10-PCS | Mod: S$PBB,,, | Performed by: PSYCHIATRY & NEUROLOGY

## 2022-07-21 PROCEDURE — 3078F PR MOST RECENT DIASTOLIC BLOOD PRESSURE < 80 MM HG: ICD-10-PCS | Mod: CPTII,,, | Performed by: PSYCHIATRY & NEUROLOGY

## 2022-07-21 PROCEDURE — 3078F DIAST BP <80 MM HG: CPT | Mod: CPTII,,, | Performed by: PSYCHIATRY & NEUROLOGY

## 2022-07-21 PROCEDURE — 1159F MED LIST DOCD IN RCRD: CPT | Mod: CPTII,,, | Performed by: PSYCHIATRY & NEUROLOGY

## 2022-07-21 RX ORDER — PRAZOSIN HYDROCHLORIDE 2 MG/1
2 CAPSULE ORAL NIGHTLY
COMMUNITY
Start: 2022-07-13

## 2022-07-21 RX ORDER — DOXEPIN HYDROCHLORIDE 10 MG/1
10 CAPSULE ORAL NIGHTLY
COMMUNITY
Start: 2022-07-13

## 2022-07-21 RX ORDER — HYDROXYZINE PAMOATE 25 MG/1
50 CAPSULE ORAL 2 TIMES DAILY
COMMUNITY
Start: 2022-07-13

## 2022-07-21 RX ORDER — OXCARBAZEPINE 300 MG/1
300 TABLET, FILM COATED ORAL 2 TIMES DAILY
COMMUNITY
Start: 2022-07-13 | End: 2023-03-31

## 2022-07-21 RX ORDER — IBUPROFEN 800 MG/1
800 TABLET ORAL 2 TIMES DAILY PRN
Qty: 30 TABLET | Refills: 1 | Status: SHIPPED | OUTPATIENT
Start: 2022-07-21 | End: 2022-10-06 | Stop reason: SDUPTHER

## 2022-07-21 RX ORDER — FREMANEZUMAB-VFRM 225 MG/1.5ML
225 INJECTION SUBCUTANEOUS
Qty: 1 EACH | Refills: 4 | Status: SHIPPED | OUTPATIENT
Start: 2022-07-21 | End: 2023-03-31

## 2022-07-21 RX ORDER — RIZATRIPTAN BENZOATE 5 MG/1
5 TABLET ORAL 2 TIMES DAILY PRN
Qty: 9 TABLET | Refills: 4 | Status: SHIPPED | OUTPATIENT
Start: 2022-07-21 | End: 2023-01-18 | Stop reason: SDUPTHER

## 2022-07-21 NOTE — PROGRESS NOTES
[facility] Neurology Faculty Addendum     I have reviewed  the resident's history, physical, assessment, and plan.  Patient left prior to being seen by me.      Briefly, this is a 37-year-old transgender male with history HIV on heart, neurosyphilis, anxiety disorder, bipolar disorder, who is here for chronic migraine with aura.  Patient had been lost to follow-up since February 2, 2021. During last visit, patient was started on Fremanezumab 225 mg once per month.  States that it did reduce headache frequency but unable to tell me how frequent his headaches were while on Fremanezumab.  Currently, his headache frequency is around 20-25 migraine headache days per month.  No evidence of medication overuse headache, as discussed in resident's note.  Will restart Fremanezumab 225 mg once per month, switch sumatriptan to rizatriptan 5 mg twice a day as needed.    RTC 3 month    Headache education provided: good sleep hygiene and 7 hours of sleep per night, stress management, medication overuse education provided. Using more 3 OTC per week may worsen headaches, high intensity interval training has shown to reduce headache frequency. Low carb, high protein has shown to reduce headache frequency. Patient is instructed in keep headache diary.       I have explained the treatment plan, diagnosis, and prognosis to patient. All questions are answered to the best of my knowledge.       Face to face time 45 minutes, including documentation, chart review, counseling, education, review of test results, relevant medical records, and coordination of care.       Jennifer Hodges MD   General Neurology  07/21/2022

## 2022-07-23 ENCOUNTER — OFFICE VISIT (OUTPATIENT)
Dept: URGENT CARE | Facility: CLINIC | Age: 37
End: 2022-07-23
Payer: MEDICAID

## 2022-07-23 VITALS
WEIGHT: 119.63 LBS | SYSTOLIC BLOOD PRESSURE: 101 MMHG | BODY MASS INDEX: 18.13 KG/M2 | HEART RATE: 71 BPM | OXYGEN SATURATION: 98 % | DIASTOLIC BLOOD PRESSURE: 78 MMHG | TEMPERATURE: 98 F | HEIGHT: 68 IN | RESPIRATION RATE: 20 BRPM

## 2022-07-23 DIAGNOSIS — H10.9 BACTERIAL CONJUNCTIVITIS OF LEFT EYE: ICD-10-CM

## 2022-07-23 DIAGNOSIS — J20.9 ACUTE BRONCHITIS, UNSPECIFIED ORGANISM: Primary | ICD-10-CM

## 2022-07-23 DIAGNOSIS — R68.89 FLU-LIKE SYMPTOMS: ICD-10-CM

## 2022-07-23 DIAGNOSIS — R05.9 COUGH: ICD-10-CM

## 2022-07-23 DIAGNOSIS — Z11.52 ENCOUNTER FOR SCREENING FOR COVID-19: ICD-10-CM

## 2022-07-23 LAB
CTP QC/QA: YES
CTP QC/QA: YES
FLUAV AG NPH QL: NEGATIVE
FLUBV AG NPH QL: NEGATIVE
SARS-COV-2 RDRP RESP QL NAA+PROBE: NEGATIVE

## 2022-07-23 PROCEDURE — 87804 INFLUENZA ASSAY W/OPTIC: CPT | Mod: PBBFAC | Performed by: NURSE PRACTITIONER

## 2022-07-23 PROCEDURE — 99214 OFFICE O/P EST MOD 30 MIN: CPT | Mod: S$PBB,,, | Performed by: NURSE PRACTITIONER

## 2022-07-23 PROCEDURE — 99215 OFFICE O/P EST HI 40 MIN: CPT | Mod: PBBFAC | Performed by: NURSE PRACTITIONER

## 2022-07-23 PROCEDURE — 99214 PR OFFICE/OUTPT VISIT, EST, LEVL IV, 30-39 MIN: ICD-10-PCS | Mod: S$PBB,,, | Performed by: NURSE PRACTITIONER

## 2022-07-23 PROCEDURE — U0002 COVID-19 LAB TEST NON-CDC: HCPCS | Mod: PBBFAC | Performed by: NURSE PRACTITIONER

## 2022-07-23 RX ORDER — PROMETHAZINE HYDROCHLORIDE AND DEXTROMETHORPHAN HYDROBROMIDE 6.25; 15 MG/5ML; MG/5ML
5 SYRUP ORAL EVERY 6 HOURS PRN
Qty: 100 ML | Refills: 0 | Status: SHIPPED | OUTPATIENT
Start: 2022-07-23 | End: 2022-07-30

## 2022-07-23 RX ORDER — ALBUTEROL SULFATE 0.83 MG/ML
2.5 SOLUTION RESPIRATORY (INHALATION) EVERY 6 HOURS PRN
Qty: 30 EACH | Refills: 0 | Status: SHIPPED | OUTPATIENT
Start: 2022-07-23 | End: 2022-08-22

## 2022-07-23 RX ORDER — LEVOCETIRIZINE DIHYDROCHLORIDE 5 MG/1
5 TABLET, FILM COATED ORAL NIGHTLY
Qty: 14 TABLET | Refills: 0 | Status: SHIPPED | OUTPATIENT
Start: 2022-07-23 | End: 2023-08-15

## 2022-07-23 RX ORDER — OFLOXACIN 3 MG/ML
1 SOLUTION/ DROPS OPHTHALMIC 4 TIMES DAILY
Qty: 5 ML | Refills: 0 | Status: SHIPPED | OUTPATIENT
Start: 2022-07-23 | End: 2022-07-30

## 2022-07-23 RX ORDER — METHYLPREDNISOLONE 4 MG/1
TABLET ORAL
Qty: 21 TABLET | Refills: 0 | Status: SHIPPED | OUTPATIENT
Start: 2022-07-23 | End: 2022-07-29

## 2022-07-23 RX ORDER — AZITHROMYCIN 250 MG/1
TABLET, FILM COATED ORAL
Qty: 6 TABLET | Refills: 0 | Status: SHIPPED | OUTPATIENT
Start: 2022-07-23 | End: 2023-03-31

## 2022-07-23 NOTE — PROGRESS NOTES
"Subjective:       Patient ID: Ezra Kennedy is a 37 y.o. male.    Vitals:  height is 5' 8.11" (1.73 m) and weight is 54.3 kg (119 lb 9.6 oz). His temperature is 98.4 °F (36.9 °C). His blood pressure is 101/78 and his pulse is 71. His respiration is 20 and oxygen saturation is 98%.     Chief Complaint: Cough (Patient std think he has bronchitis ), Eye Problem (Patient sts yesterday woke up with right eye having mucus film. ), and Epistaxis (Patient sts nose started bleeding while eating)    Patient is a 37-year-old male, here today for cough, wheeze over the past 1 and half weeks.  Patient states he has also been having some green drainage, redness to the left eye over the past few days.  Denies contact lens use.  Denies shortness of breath.  States he does have a history of asthma, requesting albuterol refill.  States he does feel some chest tightness.      HENT: Positive for congestion.    Neck: neck negative.   Cardiovascular: Negative.    Eyes: Positive for eye discharge and eye redness.   Respiratory: Positive for cough.        Objective:      Physical Exam   Constitutional: He is oriented to person, place, and time. He appears well-developed.   HENT:   Head: Normocephalic.   Ears:   Right Ear: Tympanic membrane normal.   Left Ear: Tympanic membrane normal.   Nose: Rhinorrhea present.   Eyes: Conjunctivae and EOM are normal. Pupils are equal, round, and reactive to light.      Comments: Conjunctiva reddened, no drainage.    Neck: Neck supple.   Cardiovascular: Normal rate, regular rhythm and normal heart sounds.   Pulmonary/Chest: Effort normal and breath sounds normal.   Abdominal: Bowel sounds are normal. Soft.   Musculoskeletal: Normal range of motion.         General: Normal range of motion.   Neurological: He is alert and oriented to person, place, and time.   Skin: Skin is warm and dry. Capillary refill takes less than 2 seconds.   Psychiatric: His behavior is normal.   Vitals reviewed.      "   Assessment:       1. Acute bronchitis, unspecified organism    2. Flu-like symptoms    3. Encounter for screening for COVID-19    4. Cough    5. Bacterial conjunctivitis of left eye           No results found.   Plan:         Medication as ordered. May use humidifier.  If any shortness of breath, wheezing, continued fevers or any new symptoms then immediately go to ER.    Medication as ordered. Throw away any eye makeup, disposable contact lenses or any otc eye drops currently in use. Complete full course of medication. If no improvement in symptoms in 2-3 days, or if symptoms worsen then rtc. ER precautions.       Acute bronchitis, unspecified organism  -     azithromycin (ZITHROMAX Z-MILLER) 250 MG tablet; Take 2 tablets (500 mg) on  Day 1,  followed by 1 tablet (250 mg) once daily on Days 2 through 5.  Dispense: 6 tablet; Refill: 0  -     levocetirizine (XYZAL) 5 MG tablet; Take 1 tablet (5 mg total) by mouth every evening. for 14 days  Dispense: 14 tablet; Refill: 0  -     albuterol (PROVENTIL) 2.5 mg /3 mL (0.083 %) nebulizer solution; Take 3 mLs (2.5 mg total) by nebulization every 6 (six) hours as needed for Wheezing. Rescue  Dispense: 30 each; Refill: 0  -     promethazine-dextromethorphan (PROMETHAZINE-DM) 6.25-15 mg/5 mL Syrp; Take 5 mLs by mouth every 6 (six) hours as needed (cough).  Dispense: 100 mL; Refill: 0  -     methylPREDNISolone (MEDROL DOSEPACK) 4 mg tablet; Take 6 tablets (24 mg total) by mouth once daily for 1 day, THEN 5 tablets (20 mg total) once daily for 1 day, THEN 4 tablets (16 mg total) once daily for 1 day, THEN 3 tablets (12 mg total) once daily for 1 day, THEN 2 tablets (8 mg total) once daily for 1 day, THEN 1 tablet (4 mg total) once daily for 1 day. use as directed.  Dispense: 21 tablet; Refill: 0    Flu-like symptoms  -     POCT Influenza A/B    Encounter for screening for COVID-19  -     POCT COVID-19 Rapid Screening    Cough  -     levocetirizine (XYZAL) 5 MG tablet; Take 1  tablet (5 mg total) by mouth every evening. for 14 days  Dispense: 14 tablet; Refill: 0  -     promethazine-dextromethorphan (PROMETHAZINE-DM) 6.25-15 mg/5 mL Syrp; Take 5 mLs by mouth every 6 (six) hours as needed (cough).  Dispense: 100 mL; Refill: 0  -     methylPREDNISolone (MEDROL DOSEPACK) 4 mg tablet; Take 6 tablets (24 mg total) by mouth once daily for 1 day, THEN 5 tablets (20 mg total) once daily for 1 day, THEN 4 tablets (16 mg total) once daily for 1 day, THEN 3 tablets (12 mg total) once daily for 1 day, THEN 2 tablets (8 mg total) once daily for 1 day, THEN 1 tablet (4 mg total) once daily for 1 day. use as directed.  Dispense: 21 tablet; Refill: 0    Bacterial conjunctivitis of left eye  -     ofloxacin (OCUFLOX) 0.3 % ophthalmic solution; Place 1 drop into the left eye 4 (four) times daily. for 7 days  Dispense: 5 mL; Refill: 0

## 2022-08-04 DIAGNOSIS — B20 HIV DISEASE: ICD-10-CM

## 2022-08-04 RX ORDER — BICTEGRAVIR SODIUM, EMTRICITABINE, AND TENOFOVIR ALAFENAMIDE FUMARATE 50; 200; 25 MG/1; MG/1; MG/1
1 TABLET ORAL DAILY
Qty: 30 TABLET | Refills: 0 | Status: SHIPPED | OUTPATIENT
Start: 2022-08-04 | End: 2022-11-01

## 2022-08-16 DIAGNOSIS — B20 HIV DISEASE: Primary | ICD-10-CM

## 2022-09-22 ENCOUNTER — HISTORICAL (OUTPATIENT)
Dept: ADMINISTRATIVE | Facility: HOSPITAL | Age: 37
End: 2022-09-22
Payer: MEDICAID

## 2022-09-29 ENCOUNTER — HOSPITAL ENCOUNTER (EMERGENCY)
Facility: HOSPITAL | Age: 37
Discharge: HOME | End: 2022-09-30
Attending: FAMILY MEDICINE
Payer: MEDICAID

## 2022-09-29 DIAGNOSIS — F41.9 ACUTE ANXIETY: Primary | ICD-10-CM

## 2022-09-29 LAB
ALBUMIN SERPL-MCNC: 4.3 GM/DL (ref 3.5–5)
ALBUMIN/GLOB SERPL: 1.7 RATIO (ref 1.1–2)
ALP SERPL-CCNC: 79 UNIT/L (ref 40–150)
ALT SERPL-CCNC: 12 UNIT/L (ref 0–55)
AST SERPL-CCNC: 14 UNIT/L (ref 5–34)
BASOPHILS # BLD AUTO: 0.09 X10(3)/MCL (ref 0–0.2)
BASOPHILS NFR BLD AUTO: 1 %
BILIRUBIN DIRECT+TOT PNL SERPL-MCNC: 0.7 MG/DL
BUN SERPL-MCNC: 9.6 MG/DL (ref 8.9–20.6)
CALCIUM SERPL-MCNC: 9.4 MG/DL (ref 8.4–10.2)
CHLORIDE SERPL-SCNC: 106 MMOL/L (ref 98–107)
CO2 SERPL-SCNC: 22 MMOL/L (ref 22–29)
CREAT SERPL-MCNC: 0.86 MG/DL (ref 0.73–1.18)
EOSINOPHIL # BLD AUTO: 0.15 X10(3)/MCL (ref 0–0.9)
EOSINOPHIL NFR BLD AUTO: 1.6 %
ERYTHROCYTE [DISTWIDTH] IN BLOOD BY AUTOMATED COUNT: 12.4 % (ref 11.5–17)
GFR SERPLBLD CREATININE-BSD FMLA CKD-EPI: >60 MLS/MIN/1.73/M2
GLOBULIN SER-MCNC: 2.6 GM/DL (ref 2.4–3.5)
GLUCOSE SERPL-MCNC: 98 MG/DL (ref 74–100)
HCT VFR BLD AUTO: 39 % (ref 42–52)
HGB BLD-MCNC: 13.4 GM/DL (ref 14–18)
IMM GRANULOCYTES # BLD AUTO: 0.02 X10(3)/MCL (ref 0–0.04)
IMM GRANULOCYTES NFR BLD AUTO: 0.2 %
LYMPHOCYTES # BLD AUTO: 2.58 X10(3)/MCL (ref 0.6–4.6)
LYMPHOCYTES NFR BLD AUTO: 27.7 %
MAGNESIUM SERPL-MCNC: 1.8 MG/DL (ref 1.6–2.6)
MCH RBC QN AUTO: 32.6 PG (ref 27–31)
MCHC RBC AUTO-ENTMCNC: 34.4 MG/DL (ref 33–36)
MCV RBC AUTO: 94.9 FL (ref 80–94)
MONOCYTES # BLD AUTO: 0.68 X10(3)/MCL (ref 0.1–1.3)
MONOCYTES NFR BLD AUTO: 7.3 %
NEUTROPHILS # BLD AUTO: 5.8 X10(3)/MCL (ref 2.1–9.2)
NEUTROPHILS NFR BLD AUTO: 62.2 %
NRBC BLD AUTO-RTO: 0 %
PLATELET # BLD AUTO: 246 X10(3)/MCL (ref 130–400)
PMV BLD AUTO: 10 FL (ref 7.4–10.4)
POTASSIUM SERPL-SCNC: 3.3 MMOL/L (ref 3.5–5.1)
PROT SERPL-MCNC: 6.9 GM/DL (ref 6.4–8.3)
RBC # BLD AUTO: 4.11 X10(6)/MCL (ref 4.7–6.1)
SODIUM SERPL-SCNC: 139 MMOL/L (ref 136–145)
WBC # SPEC AUTO: 9.3 X10(3)/MCL (ref 4.5–11.5)

## 2022-09-29 PROCEDURE — 96361 HYDRATE IV INFUSION ADD-ON: CPT

## 2022-09-29 PROCEDURE — 85025 COMPLETE CBC W/AUTO DIFF WBC: CPT | Performed by: FAMILY MEDICINE

## 2022-09-29 PROCEDURE — 96374 THER/PROPH/DIAG INJ IV PUSH: CPT

## 2022-09-29 PROCEDURE — 83735 ASSAY OF MAGNESIUM: CPT | Performed by: FAMILY MEDICINE

## 2022-09-29 PROCEDURE — 63600175 PHARM REV CODE 636 W HCPCS: Performed by: FAMILY MEDICINE

## 2022-09-29 PROCEDURE — 36415 COLL VENOUS BLD VENIPUNCTURE: CPT | Performed by: FAMILY MEDICINE

## 2022-09-29 PROCEDURE — 25000003 PHARM REV CODE 250: Performed by: FAMILY MEDICINE

## 2022-09-29 PROCEDURE — 80053 COMPREHEN METABOLIC PANEL: CPT | Performed by: FAMILY MEDICINE

## 2022-09-29 PROCEDURE — 99284 EMERGENCY DEPT VISIT MOD MDM: CPT | Mod: 25

## 2022-09-29 RX ORDER — LORAZEPAM 2 MG/ML
1 INJECTION INTRAMUSCULAR
Status: COMPLETED | OUTPATIENT
Start: 2022-09-29 | End: 2022-09-29

## 2022-09-29 RX ADMIN — SODIUM CHLORIDE 1000 ML: 9 INJECTION, SOLUTION INTRAVENOUS at 11:09

## 2022-09-29 RX ADMIN — LORAZEPAM 1 MG: 2 INJECTION INTRAMUSCULAR; INTRAVENOUS at 10:09

## 2022-09-30 VITALS
OXYGEN SATURATION: 97 % | TEMPERATURE: 97 F | WEIGHT: 120 LBS | HEIGHT: 68 IN | DIASTOLIC BLOOD PRESSURE: 78 MMHG | HEART RATE: 91 BPM | RESPIRATION RATE: 20 BRPM | SYSTOLIC BLOOD PRESSURE: 105 MMHG | BODY MASS INDEX: 18.19 KG/M2

## 2022-09-30 LAB
APPEARANCE UR: CLEAR
BACTERIA #/AREA URNS AUTO: ABNORMAL /HPF
BILIRUB UR QL STRIP.AUTO: NEGATIVE MG/DL
COLOR UR AUTO: ABNORMAL
GLUCOSE UR QL STRIP.AUTO: NORMAL MG/DL
HYALINE CASTS #/AREA URNS LPF: ABNORMAL /LPF
KETONES UR QL STRIP.AUTO: NEGATIVE MG/DL
LEUKOCYTE ESTERASE UR QL STRIP.AUTO: NEGATIVE UNIT/L
NITRITE UR QL STRIP.AUTO: NEGATIVE
PH UR STRIP.AUTO: 7 [PH]
PROT UR QL STRIP.AUTO: NEGATIVE MG/DL
RBC #/AREA URNS AUTO: ABNORMAL /HPF
RBC UR QL AUTO: NEGATIVE UNIT/L
SP GR UR STRIP.AUTO: 1.01
SQUAMOUS #/AREA URNS LPF: ABNORMAL /HPF
UROBILINOGEN UR STRIP-ACNC: ABNORMAL MG/DL
WBC #/AREA URNS AUTO: ABNORMAL /HPF

## 2022-09-30 PROCEDURE — 81001 URINALYSIS AUTO W/SCOPE: CPT | Performed by: FAMILY MEDICINE

## 2022-09-30 PROCEDURE — 63600175 PHARM REV CODE 636 W HCPCS: Performed by: FAMILY MEDICINE

## 2022-09-30 RX ADMIN — SODIUM CHLORIDE, POTASSIUM CHLORIDE, SODIUM LACTATE AND CALCIUM CHLORIDE 1000 ML: 600; 310; 30; 20 INJECTION, SOLUTION INTRAVENOUS at 12:09

## 2022-09-30 NOTE — ED PROVIDER NOTES
Encounter Date: 9/29/2022       History     Chief Complaint   Patient presents with    Panic Attack     Anxiety attack after argument at home PTA. Pt AAO, jaws clenched.      37-year-old gentleman presents emergency room by ambulance due to having difficulty talking because his jaws clenched, and having spasms after having a panic attack.  Patient denies chest pain or shortness of breath.  Denies nausea or vomiting.  Reports symptoms currently severe, nothing makes better or worse.    The history is provided by the patient.   Review of patient's allergies indicates:  No Known Allergies  Past Medical History:   Diagnosis Date    Bipolar disorder     Depression     GERD (gastroesophageal reflux disease)     HIV infection      History reviewed. No pertinent surgical history.  Family History   Problem Relation Age of Onset    Arthritis Mother     COPD Mother     Lung cancer Mother     Fibromyalgia Mother     Hypertension Father     Hyperlipidemia Father     Mental illness Brother     Alcohol abuse Brother      Social History     Tobacco Use    Smoking status: Every Day     Packs/day: 0.50     Types: Cigarettes    Smokeless tobacco: Never   Substance Use Topics    Alcohol use: Yes     Comment: occassionally    Drug use: Yes     Types: Marijuana     Review of Systems   Constitutional:  Negative for chills, fatigue and fever.   HENT:  Negative for ear pain, rhinorrhea and sore throat.    Eyes:  Negative for photophobia and pain.   Respiratory:  Negative for cough, shortness of breath and wheezing.    Cardiovascular:  Negative for chest pain.   Gastrointestinal:  Negative for abdominal pain, diarrhea, nausea and vomiting.   Genitourinary:  Negative for dysuria.   Neurological:  Negative for dizziness, weakness and headaches.   All other systems reviewed and are negative.    Physical Exam     Initial Vitals [09/29/22 2209]   BP Pulse Resp Temp SpO2   123/86 72 20 97.2 °F (36.2 °C) 100 %      MAP       --         Physical  Exam    Nursing note and vitals reviewed.  Constitutional: He appears well-developed and well-nourished.   HENT:   Head: Normocephalic and atraumatic.   Currently has jaw clenched, but able to talk.   Eyes: EOM are normal. Pupils are equal, round, and reactive to light.   Neck: Neck supple.   Normal range of motion.  Cardiovascular:  Normal rate, regular rhythm, normal heart sounds and intact distal pulses.     Exam reveals no gallop and no friction rub.       No murmur heard.  Pulmonary/Chest: Breath sounds normal. No respiratory distress.   Abdominal: Abdomen is soft. Bowel sounds are normal. He exhibits no distension. There is no abdominal tenderness.   Musculoskeletal:         General: Normal range of motion.      Cervical back: Normal range of motion and neck supple.     Neurological: He is alert and oriented to person, place, and time. He has normal strength.   Skin: Skin is warm and dry.   Psychiatric: His behavior is normal. Judgment and thought content normal. His mood appears anxious.       ED Course   Procedures  Labs Reviewed   URINALYSIS, REFLEX TO URINE CULTURE - Abnormal; Notable for the following components:       Result Value    Color, UA Light-Yellow (*)     Urobilinogen, UA 1+ (*)     Squamous Epithelial Cells, UA Trace (*)     All other components within normal limits   CBC WITH DIFFERENTIAL - Abnormal; Notable for the following components:    RBC 4.11 (*)     Hgb 13.4 (*)     Hct 39.0 (*)     MCV 94.9 (*)     MCH 32.6 (*)     All other components within normal limits   COMPREHENSIVE METABOLIC PANEL - Abnormal; Notable for the following components:    Potassium Level 3.3 (*)     All other components within normal limits   MAGNESIUM - Normal   CBC W/ AUTO DIFFERENTIAL    Narrative:     The following orders were created for panel order CBC Auto Differential.  Procedure                               Abnormality         Status                     ---------                               -----------          ------                     CBC with Differential[[id number]]        Abnormal            Final result                 Please view results for these tests on the individual orders.          Imaging Results    None          Medications   lactated ringers bolus 1,000 mL (1,000 mLs Intravenous New Bag 9/30/22 0010)   sodium chloride 0.9% bolus 1,000 mL (0 mLs Intravenous Stopped 9/30/22 0000)   lorazepam injection 1 mg (1 mg Intravenous Given 9/29/22 2259)                 ED Course as of 09/30/22 0107   u Sep 29, 2022   2334 WBC: 9.3 [MW]   2334 Hemoglobin(!): 13.4 [MW]   2334 Hematocrit(!): 39.0 [MW]   2334 Platelets: 246 [MW]   2334 Magnesium: 1.80 [MW]   Fri Sep 30, 2022   0000 Sodium: 139 [MW]   0000 Potassium(!): 3.3 [MW]   0000 Chloride: 106 [MW]   0000 CO2: 22 [MW]   0000 Glucose: 98 [MW]   0001 BUN: 9.6 [MW]   0001 Creatinine: 0.86 [MW]   0001 Albumin: 4.3 [MW]   0012 Patient feeling improved, moving his jaw normally at this time.  Patient did appear a bit dehydrated, will give additional 1L of IV fluids. [MW]   0048 Color, UA(!): Light-Yellow [MW]   0048 Appearance, UA: Clear [MW]   0048 Color, UA(!): Light-Yellow [MW]   0048 NITRITE UA: Negative [MW]   0048 Bacteria, UA: None Seen  Patient feeling improved; stable for discharge to home.  ER precautions for any acute worsening. [MW]      ED Course User Index  [MW] Davie Vivas MD                 Clinical Impression:   Final diagnoses:  [F41.9] Acute anxiety (Primary)      ED Disposition Condition    Discharge Stable          ED Prescriptions    None       Follow-up Information       Follow up With Specialties Details Why Contact Info    [facility] Emergency Medicine  As needed, If symptoms worsen [address] [place] [address] [phone]    Primary Care Physician  In 5 days               Davie Vivas MD  09/30/22 0109

## 2022-10-05 DIAGNOSIS — G43.019 COMMON MIGRAINE WITH INTRACTABLE MIGRAINE: Primary | ICD-10-CM

## 2022-10-06 ENCOUNTER — TELEPHONE (OUTPATIENT)
Dept: INTERNAL MEDICINE | Facility: CLINIC | Age: 37
End: 2022-10-06
Payer: MEDICAID

## 2022-10-06 RX ORDER — IBUPROFEN 800 MG/1
800 TABLET ORAL 2 TIMES DAILY PRN
Qty: 30 TABLET | Refills: 1 | Status: SHIPPED | OUTPATIENT
Start: 2022-10-06 | End: 2022-11-01 | Stop reason: SDUPTHER

## 2022-10-06 RX ORDER — IBUPROFEN 800 MG/1
800 TABLET ORAL 2 TIMES DAILY PRN
Qty: 30 TABLET | Refills: 1 | OUTPATIENT
Start: 2022-10-06

## 2022-10-06 NOTE — PROGRESS NOTES
Pt no showed for visit in Aug and has not been rescheduled. Please contact and schedule a visit in the next 1-2 months. Thanks

## 2022-10-06 NOTE — TELEPHONE ENCOUNTER
----- Message from Liz Duncan MD sent at 10/6/2022 11:03 AM CDT -----  Refill request sent to me but I only saw pt once while working in Neurology clinic. Any refill requests need to go to either PCP or Neuro.     Thank you.

## 2022-11-01 DIAGNOSIS — B20 HIV DISEASE: ICD-10-CM

## 2022-11-01 DIAGNOSIS — G43.019 COMMON MIGRAINE WITH INTRACTABLE MIGRAINE: Primary | ICD-10-CM

## 2022-11-01 RX ORDER — BICTEGRAVIR SODIUM, EMTRICITABINE, AND TENOFOVIR ALAFENAMIDE FUMARATE 50; 200; 25 MG/1; MG/1; MG/1
TABLET ORAL
Qty: 30 TABLET | Refills: 0 | Status: SHIPPED | OUTPATIENT
Start: 2022-11-01 | End: 2022-11-28

## 2022-11-01 RX ORDER — IBUPROFEN 800 MG/1
800 TABLET ORAL 2 TIMES DAILY PRN
Qty: 30 TABLET | Refills: 1 | Status: SHIPPED | OUTPATIENT
Start: 2022-11-01 | End: 2022-12-19 | Stop reason: SDUPTHER

## 2022-11-01 NOTE — TELEPHONE ENCOUNTER
Message sent to  for scheduling. Requested to have patient scheduled before end of month if possible.

## 2022-11-02 ENCOUNTER — DOCUMENTATION ONLY (OUTPATIENT)
Dept: INFECTIOUS DISEASES | Facility: CLINIC | Age: 37
End: 2022-11-02
Payer: MEDICAID

## 2022-11-02 NOTE — LETTER
November 2, 2022    Ezra Valdovinosbarbra  [address] [place] [address] [facility]  [address] [facility] [address]  Phone: [phone] Dear Mr. Kennedy:    You have any appointment scheduled on 12/14/22 at 8:30 AM [facility] with LIZABETH Lopez       If you have any questions or concerns, please don't hesitate to call.    Sincerely,        LIZABETH Shukla

## 2022-11-04 RX ORDER — VENLAFAXINE HYDROCHLORIDE 150 MG/1
CAPSULE, EXTENDED RELEASE ORAL
Qty: 30 CAPSULE | Refills: 1 | Status: SHIPPED | OUTPATIENT
Start: 2022-11-04 | End: 2023-03-31

## 2022-12-19 DIAGNOSIS — G43.019 COMMON MIGRAINE WITH INTRACTABLE MIGRAINE: ICD-10-CM

## 2022-12-19 RX ORDER — IBUPROFEN 800 MG/1
800 TABLET ORAL 2 TIMES DAILY PRN
Qty: 30 TABLET | Refills: 1 | Status: SHIPPED | OUTPATIENT
Start: 2022-12-19 | End: 2023-01-31 | Stop reason: SDUPTHER

## 2022-12-19 RX ORDER — GABAPENTIN 600 MG/1
600 TABLET ORAL 2 TIMES DAILY PRN
Qty: 60 TABLET | Refills: 1 | Status: SHIPPED | OUTPATIENT
Start: 2022-12-19

## 2022-12-19 NOTE — TELEPHONE ENCOUNTER
Received a refill request through the fax que for Gabapentin    LOV: 03/23/22  NOV: 01/09/23  No Shows: 08/23/22  Cancellations: 07/25/22

## 2023-01-18 DIAGNOSIS — G43.019 COMMON MIGRAINE WITH INTRACTABLE MIGRAINE: Primary | ICD-10-CM

## 2023-01-25 RX ORDER — RIZATRIPTAN BENZOATE 5 MG/1
5 TABLET ORAL 2 TIMES DAILY PRN
Qty: 9 TABLET | Refills: 4 | Status: SHIPPED | OUTPATIENT
Start: 2023-01-25 | End: 2023-08-15

## 2023-01-25 NOTE — TELEPHONE ENCOUNTER
Spoke to patient's mother, Thalia. She provided me with the patient's updated cell phone number, and I updated his contact information. She also reported that the patient's cell phone is broken, so he cannot be reached unless it's in person. States that she has not talked to patient in two weeks. Informed her that patient missed neurology appointment yesterday and that he has requested a refill on his headache medication. She states that he doesn't have a vehicle and walks to his [facility] appointments. States that he probably missed his appointment due to the weather yesterday and that he had no way of letting us know. I've rescheduled his follow-up appointment with Dr. Hodges 5/3/23 with a 2:15 arrival time. Thalia voiced understanding and states that she will make sure that the patient will be here. I've also pended a medication refill to Dr. Hodges with enough refills to last until his next ov. Explained to Thalia that the patient must come to next appointment to continue receiving medication refills. She voiced understanding.

## 2023-01-31 DIAGNOSIS — G43.019 COMMON MIGRAINE WITH INTRACTABLE MIGRAINE: ICD-10-CM

## 2023-01-31 RX ORDER — IBUPROFEN 800 MG/1
800 TABLET ORAL 2 TIMES DAILY PRN
Qty: 30 TABLET | Refills: 1 | Status: SHIPPED | OUTPATIENT
Start: 2023-01-31

## 2023-03-30 ENCOUNTER — APPOINTMENT (OUTPATIENT)
Dept: LAB | Facility: HOSPITAL | Age: 38
End: 2023-03-30
Attending: NURSE PRACTITIONER
Payer: MEDICAID

## 2023-03-31 ENCOUNTER — OFFICE VISIT (OUTPATIENT)
Dept: INFECTIOUS DISEASES | Facility: CLINIC | Age: 38
End: 2023-03-31
Payer: MEDICAID

## 2023-03-31 VITALS
SYSTOLIC BLOOD PRESSURE: 99 MMHG | HEIGHT: 68 IN | HEART RATE: 78 BPM | RESPIRATION RATE: 16 BRPM | TEMPERATURE: 98 F | BODY MASS INDEX: 18.79 KG/M2 | DIASTOLIC BLOOD PRESSURE: 69 MMHG | WEIGHT: 124 LBS

## 2023-03-31 DIAGNOSIS — B20 HIV DISEASE: Primary | ICD-10-CM

## 2023-03-31 DIAGNOSIS — E55.9 VITAMIN D DEFICIENCY: ICD-10-CM

## 2023-03-31 DIAGNOSIS — A53.9 SYPHILIS (ACQUIRED): ICD-10-CM

## 2023-03-31 PROCEDURE — 1160F PR REVIEW ALL MEDS BY PRESCRIBER/CLIN PHARMACIST DOCUMENTED: ICD-10-PCS | Mod: CPTII,,, | Performed by: NURSE PRACTITIONER

## 2023-03-31 PROCEDURE — 3074F SYST BP LT 130 MM HG: CPT | Mod: CPTII,,, | Performed by: NURSE PRACTITIONER

## 2023-03-31 PROCEDURE — 99214 OFFICE O/P EST MOD 30 MIN: CPT | Mod: S$PBB,,, | Performed by: NURSE PRACTITIONER

## 2023-03-31 PROCEDURE — 99214 PR OFFICE/OUTPT VISIT, EST, LEVL IV, 30-39 MIN: ICD-10-PCS | Mod: S$PBB,,, | Performed by: NURSE PRACTITIONER

## 2023-03-31 PROCEDURE — 3008F PR BODY MASS INDEX (BMI) DOCUMENTED: ICD-10-PCS | Mod: CPTII,,, | Performed by: NURSE PRACTITIONER

## 2023-03-31 PROCEDURE — 3078F DIAST BP <80 MM HG: CPT | Mod: CPTII,,, | Performed by: NURSE PRACTITIONER

## 2023-03-31 PROCEDURE — 3078F PR MOST RECENT DIASTOLIC BLOOD PRESSURE < 80 MM HG: ICD-10-PCS | Mod: CPTII,,, | Performed by: NURSE PRACTITIONER

## 2023-03-31 PROCEDURE — 1160F RVW MEDS BY RX/DR IN RCRD: CPT | Mod: CPTII,,, | Performed by: NURSE PRACTITIONER

## 2023-03-31 PROCEDURE — 1159F MED LIST DOCD IN RCRD: CPT | Mod: CPTII,,, | Performed by: NURSE PRACTITIONER

## 2023-03-31 PROCEDURE — 1159F PR MEDICATION LIST DOCUMENTED IN MEDICAL RECORD: ICD-10-PCS | Mod: CPTII,,, | Performed by: NURSE PRACTITIONER

## 2023-03-31 PROCEDURE — 3074F PR MOST RECENT SYSTOLIC BLOOD PRESSURE < 130 MM HG: ICD-10-PCS | Mod: CPTII,,, | Performed by: NURSE PRACTITIONER

## 2023-03-31 PROCEDURE — 96372 THER/PROPH/DIAG INJ SC/IM: CPT | Mod: PBBFAC

## 2023-03-31 PROCEDURE — 3008F BODY MASS INDEX DOCD: CPT | Mod: CPTII,,, | Performed by: NURSE PRACTITIONER

## 2023-03-31 PROCEDURE — 99214 OFFICE O/P EST MOD 30 MIN: CPT | Mod: PBBFAC,25 | Performed by: NURSE PRACTITIONER

## 2023-03-31 RX ORDER — BICTEGRAVIR SODIUM, EMTRICITABINE, AND TENOFOVIR ALAFENAMIDE FUMARATE 50; 200; 25 MG/1; MG/1; MG/1
1 TABLET ORAL DAILY
Qty: 90 TABLET | Refills: 1 | Status: SHIPPED | OUTPATIENT
Start: 2023-03-31 | End: 2023-07-13 | Stop reason: SDUPTHER

## 2023-03-31 RX ORDER — ERGOCALCIFEROL 1.25 MG/1
50000 CAPSULE ORAL
Qty: 12 CAPSULE | Refills: 1 | Status: SHIPPED | OUTPATIENT
Start: 2023-03-31 | End: 2023-07-13 | Stop reason: SDUPTHER

## 2023-03-31 RX ADMIN — PENICILLIN G BENZATHINE 2.4 MILLION UNITS: 1200000 INJECTION, SUSPENSION INTRAMUSCULAR at 02:03

## 2023-03-31 NOTE — PROGRESS NOTES
Subjective     Patient ID: Ezra Kennedy is a 38 y.o. male.    Chief Complaint: Followup HIV    3/31/23  Jaziel is a 39 yo transgender male to female here today for HIV f/u visit.  Taking Biktarvy daily and tolerates it well.  Labs collected 3/30/23 VL & Cd4 pending.  Not currently on estrogen therapy, but is taking testosterone blockers.  Exposed to syphilis by partner, RPR titer increased from 16 dils 5/22 to 32 dils 3/20/23.  Will treat today with Bicillin 2.4 mil units IM x 1 dose.  Not currently taking vitamin d replacement, will order.  Vit D level 20.  Did relapse with meth use (snort/smoke) this past year but has cleaned up & is feeling much better overall.  All questions answered & concerns addressed.    5/24/22  Jaziel is a 36 yo transgender male to female presenting today for HIV f/u visit.  Not currently on hormone therapy but plans to establish care with Aysha Walker at [facility] for same.  Reports 100% adherent to Biktarvy, tolerates well.  Labs collected this am, last resulted labs 11/21 VL <20, CD4 875.  Treated for syphilis 8/21 with baseline titer of 128 dils, decreased to 64 dils 11/21.  Collected titer today, results pending.  Tells me that they are monogamous with Shant & declines need for repeat STI screening today.  Recently treated for STI along with Shant for known exposure.  Will screen next visit.  Doing ok overall, but did just lose a friend to likely drug overdose last week.  Mourning the loss.  All questions answered & concerns addressed.      11/17/21  Jaziel is a 35 yo transgender male to female presenting today for HIV f/u visit.  She reports adherence to Genvoya, but states that she did have a 1 week treatment interruption about 2 months ago due to trouble getting mom's house to  medication.  Partner Shant takes Biktarvy & being on same medication would be helpful.  Also, diagnosed with fibromyalgia and limiting any unnecessary medication is appreciated.  Will revise ART to  Biktarvy 1 po daily, voiced appreciation.  Last labs 8/3/21 VL <20, Cd4 617.  Completed treatment for syphilis with high titer of 128 dils 8/2021.  Will repeat titer today.  Rash resolved.  Appreciates flu vax today.  Remains in care with PCP MIRANDA Clement NP.  All questions answered & concerns addressed.    Review of Systems   Constitutional: Negative.    HENT: Negative.     Respiratory: Negative.     Cardiovascular: Negative.    Gastrointestinal: Negative.    Genitourinary: Negative.    Integumentary:  Negative.   Neurological: Negative.    Hematological: Negative.    Psychiatric/Behavioral: Negative.          Objective     Physical Exam  Vitals reviewed.   Constitutional:       General: He is not in acute distress.     Appearance: Normal appearance. He is not toxic-appearing.   HENT:      Mouth/Throat:      Mouth: Mucous membranes are moist.      Pharynx: Oropharynx is clear.   Eyes:      Conjunctiva/sclera: Conjunctivae normal.   Cardiovascular:      Rate and Rhythm: Normal rate and regular rhythm.   Pulmonary:      Effort: Pulmonary effort is normal. No respiratory distress.      Breath sounds: Normal breath sounds.   Abdominal:      General: Abdomen is flat. Bowel sounds are normal.      Palpations: Abdomen is soft.   Musculoskeletal:         General: Normal range of motion.      Cervical back: Normal range of motion.   Lymphadenopathy:      Cervical: No cervical adenopathy.   Skin:     General: Skin is warm and dry.   Neurological:      General: No focal deficit present.      Mental Status: He is alert and oriented to person, place, and time. Mental status is at baseline.   Psychiatric:         Mood and Affect: Mood normal.         Behavior: Behavior normal.          Assessment and Plan     Problem List Items Addressed This Visit      HIV disease  -     BIKTARVY -25 mg (25 kg or greater); Take 1 tablet by mouth once daily.  Dispense: 90 tablet; Refill: 1  Adherence and sexual health counseling done.  Use  condoms for all sexual encounters.  Blood precautions.   Continue Biktarvy as directed.  Labs prior to next visit.   RTC 3 months with Alanna.     HIV Wellness:  Anal pap: 8/21 NIL  Oral CT/GC: 8/21 neg  Anal CT/GC: 8/21 neg  Urine CT/GC: 8/21 neg  RPR: 11/21 64 dils, 5/22  Ophth: will schedule appt (5/22)    Syphilis (acquired)  -     penicillin G benzathine (BICILLIN LA) injection 2.4 Million Units  History: Titer 8/21 128 dils treated                      11/21 64 dils                       5/22  16 dils                       3/23 32 dils (Bicillin 2.4 mil units IM)  Repeat titer in 3 months.     Vitamin D deficiency  -     ergocalciferol (ERGOCALCIFEROL) 50,000 unit Cap; Take 1 capsule (50,000 Units total) by mouth every 7 days.  Dispense: 12 capsule; Refill: 1  Vitamin d2 weekly.

## 2023-04-03 ENCOUNTER — TELEPHONE (OUTPATIENT)
Dept: INFECTIOUS DISEASES | Facility: CLINIC | Age: 38
End: 2023-04-03
Payer: MEDICAID

## 2023-04-03 ENCOUNTER — CLINICAL SUPPORT (OUTPATIENT)
Dept: INFECTIOUS DISEASES | Facility: CLINIC | Age: 38
End: 2023-04-03
Payer: MEDICAID

## 2023-04-03 DIAGNOSIS — R76.8 HEPATITIS C ANTIBODY POSITIVE IN BLOOD: ICD-10-CM

## 2023-04-03 DIAGNOSIS — R76.8 HEPATITIS C ANTIBODY POSITIVE IN BLOOD: Primary | ICD-10-CM

## 2023-04-03 LAB — HCV AB SERPL QL IA: REACTIVE

## 2023-04-03 PROCEDURE — 36415 COLL VENOUS BLD VENIPUNCTURE: CPT

## 2023-04-03 PROCEDURE — 86803 HEPATITIS C AB TEST: CPT

## 2023-04-03 PROCEDURE — 87522 HEPATITIS C REVRS TRNSCRPJ: CPT | Mod: 90

## 2023-04-03 NOTE — TELEPHONE ENCOUNTER
Lab results reviewed.  Hepatitis C ab newly reactive.  No known history of Hep C exposure.  Will return this week for additional labs.  Prefers to have labs collected in clinic. Orders placed. All questions answered & concerns addressed.

## 2023-04-03 NOTE — TELEPHONE ENCOUNTER
----- Message from Clay Boo LPN sent at 4/3/2023  7:32 AM CDT -----    ----- Message -----  From: Background User Lab  Sent: 3/30/2023  10:58 AM CDT  To: Clay Boo LPN

## 2023-04-06 LAB — HCV RNA SERPL NAA+PROBE-ACNC: ABNORMAL IU/ML

## 2023-04-11 ENCOUNTER — TELEPHONE (OUTPATIENT)
Dept: INFECTIOUS DISEASES | Facility: CLINIC | Age: 38
End: 2023-04-11
Payer: MEDICAID

## 2023-04-11 NOTE — TELEPHONE ENCOUNTER
Hep C viral load is +.  Jaziel will need treatment. Please notify them of results and have them return for appropriate pretreament workup. Will need sooner visit with me to initiate treatment as well.  Partner(s) need to be checked for HCV as well.  Thank you.

## 2023-04-12 DIAGNOSIS — R76.8 HEPATITIS C ANTIBODY POSITIVE IN BLOOD: Primary | ICD-10-CM

## 2023-04-12 NOTE — TELEPHONE ENCOUNTER
Spoke with patient and gave results.  Explained to patient that we will schedule him sooner, to be able to start treatment for the hcv and partner will need to be checked for this as well.  Informed him that he will need some new labs prior to the appt.  Voiced understanding.

## 2023-05-18 ENCOUNTER — HOSPITAL ENCOUNTER (OUTPATIENT)
Dept: RADIOLOGY | Facility: HOSPITAL | Age: 38
Discharge: HOME | End: 2023-05-18
Attending: NURSE PRACTITIONER
Payer: MEDICAID

## 2023-05-18 DIAGNOSIS — R76.8 HEPATITIS C ANTIBODY POSITIVE IN BLOOD: ICD-10-CM

## 2023-05-18 PROCEDURE — 76705 ECHO EXAM OF ABDOMEN: CPT | Mod: TC

## 2023-05-22 NOTE — PROGRESS NOTES
Reviewed US. Pt has a some hepatic steatosis. Please contact patient with results and the following recommendations:  Goal BMI < 25.   Limit consumption of high fat foods, high sugar foods, and salt.   Avoid alcohol and illicit drug use.  Increase exercise activity; 30 minutes of strenuous activity 3-5 x week.

## 2023-05-24 NOTE — PROGRESS NOTES
Phoned patient. Discussed Hepatic Steatosis and Limit consumption of high fat foods, high sugar foods, and salt.   Avoid alcohol and illicit drug use.  Increase exercise activity; 30 minutes of strenuous activity 3-5 x week. Voiced understanding.

## 2023-05-24 NOTE — PROGRESS NOTES
Phoned patient. No answer on cell number, message left on voice mail. Phoned home number, mother states not at home and to try cell number

## 2023-07-13 ENCOUNTER — PROCEDURE VISIT (OUTPATIENT)
Dept: INFECTIOUS DISEASES | Facility: CLINIC | Age: 38
End: 2023-07-13
Payer: MEDICAID

## 2023-07-13 ENCOUNTER — OFFICE VISIT (OUTPATIENT)
Dept: INFECTIOUS DISEASES | Facility: CLINIC | Age: 38
End: 2023-07-13
Payer: MEDICAID

## 2023-07-13 VITALS
BODY MASS INDEX: 18.01 KG/M2 | HEART RATE: 84 BPM | HEIGHT: 68 IN | WEIGHT: 118.81 LBS | SYSTOLIC BLOOD PRESSURE: 128 MMHG | RESPIRATION RATE: 18 BRPM | TEMPERATURE: 98 F | DIASTOLIC BLOOD PRESSURE: 86 MMHG

## 2023-07-13 DIAGNOSIS — B20 HIV DISEASE: ICD-10-CM

## 2023-07-13 DIAGNOSIS — B20 HIV INFECTION, UNSPECIFIED SYMPTOM STATUS: ICD-10-CM

## 2023-07-13 DIAGNOSIS — B18.2 CHRONIC HEPATITIS C WITHOUT HEPATIC COMA: Primary | ICD-10-CM

## 2023-07-13 DIAGNOSIS — B18.2 CHRONIC HEPATITIS C WITHOUT HEPATIC COMA: ICD-10-CM

## 2023-07-13 DIAGNOSIS — Z11.3 ROUTINE SCREENING FOR STI (SEXUALLY TRANSMITTED INFECTION): ICD-10-CM

## 2023-07-13 DIAGNOSIS — B20 HIV DISEASE: Primary | ICD-10-CM

## 2023-07-13 DIAGNOSIS — E55.9 VITAMIN D DEFICIENCY: ICD-10-CM

## 2023-07-13 DIAGNOSIS — Z86.19 HISTORY OF SYPHILIS: ICD-10-CM

## 2023-07-13 LAB
ALBUMIN SERPL-MCNC: 4.1 G/DL (ref 3.5–5)
ALBUMIN/GLOB SERPL: 1.2 RATIO (ref 1.1–2)
ALP SERPL-CCNC: 85 UNIT/L (ref 40–150)
ALT SERPL-CCNC: 174 UNIT/L (ref 0–55)
AMPHET UR QL SCN: POSITIVE
AST SERPL-CCNC: 96 UNIT/L (ref 5–34)
BARBITURATE SCN PRESENT UR: NEGATIVE
BASOPHILS # BLD AUTO: 0.07 X10(3)/MCL
BASOPHILS NFR BLD AUTO: 1.3 %
BENZODIAZ UR QL SCN: NEGATIVE
BILIRUBIN DIRECT+TOT PNL SERPL-MCNC: 0.7 MG/DL
BUN SERPL-MCNC: 5.4 MG/DL (ref 8.9–20.6)
C TRACH DNA SPEC QL NAA+PROBE: DETECTED
C TRACH DNA SPEC QL NAA+PROBE: NOT DETECTED
CALCIUM SERPL-MCNC: 9.7 MG/DL (ref 8.4–10.2)
CANNABINOIDS UR QL SCN: NEGATIVE
CHLORIDE SERPL-SCNC: 102 MMOL/L (ref 98–107)
CO2 SERPL-SCNC: 30 MMOL/L (ref 22–29)
COCAINE UR QL SCN: NEGATIVE
CREAT SERPL-MCNC: 1.02 MG/DL (ref 0.73–1.18)
EOSINOPHIL # BLD AUTO: 0.22 X10(3)/MCL (ref 0–0.9)
EOSINOPHIL NFR BLD AUTO: 4.2 %
ERYTHROCYTE [DISTWIDTH] IN BLOOD BY AUTOMATED COUNT: 12 % (ref 11.5–17)
FENTANYL UR QL SCN: POSITIVE
FERRITIN SERPL-MCNC: 130.36 NG/ML (ref 21.81–274.66)
GFR SERPLBLD CREATININE-BSD FMLA CKD-EPI: >60 MLS/MIN/1.73/M2
GLOBULIN SER-MCNC: 3.4 GM/DL (ref 2.4–3.5)
GLUCOSE SERPL-MCNC: 84 MG/DL (ref 74–100)
HAV AB SER QL IA: REACTIVE
HBV CORE AB SERPL QL IA: NONREACTIVE
HBV SURFACE AB SER-ACNC: 1322.23 MIU/ML
HBV SURFACE AB SERPL IA-ACNC: REACTIVE M[IU]/ML
HBV SURFACE AG SERPL QL IA: NONREACTIVE
HCT VFR BLD AUTO: 47.2 % (ref 42–52)
HGB BLD-MCNC: 15.8 G/DL (ref 14–18)
IMM GRANULOCYTES # BLD AUTO: 0.01 X10(3)/MCL (ref 0–0.04)
IMM GRANULOCYTES NFR BLD AUTO: 0.2 %
LYMPHOCYTES # BLD AUTO: 1.73 X10(3)/MCL (ref 0.6–4.6)
LYMPHOCYTES NFR BLD AUTO: 32.8 %
MCH RBC QN AUTO: 31.7 PG (ref 27–31)
MCHC RBC AUTO-ENTMCNC: 33.5 G/DL (ref 33–36)
MCV RBC AUTO: 94.6 FL (ref 80–94)
MDMA UR QL SCN: NEGATIVE
MONOCYTES # BLD AUTO: 0.51 X10(3)/MCL (ref 0.1–1.3)
MONOCYTES NFR BLD AUTO: 9.7 %
N GONORRHOEA DNA SPEC QL NAA+PROBE: NOT DETECTED
N GONORRHOEA DNA SPEC QL NAA+PROBE: NOT DETECTED
NEUTROPHILS # BLD AUTO: 2.73 X10(3)/MCL (ref 2.1–9.2)
NEUTROPHILS NFR BLD AUTO: 51.8 %
NRBC BLD AUTO-RTO: 0 %
OPIATES UR QL SCN: NEGATIVE
PCP UR QL: NEGATIVE
PH UR: 7 [PH] (ref 3–11)
PLATELET # BLD AUTO: 238 X10(3)/MCL (ref 130–400)
PMV BLD AUTO: 10.6 FL (ref 7.4–10.4)
POTASSIUM SERPL-SCNC: 4.4 MMOL/L (ref 3.5–5.1)
PROT SERPL-MCNC: 7.5 GM/DL (ref 6.4–8.3)
RBC # BLD AUTO: 4.99 X10(6)/MCL (ref 4.7–6.1)
SODIUM SERPL-SCNC: 139 MMOL/L (ref 136–145)
T PALLIDUM AB SER QL: REACTIVE
WBC # SPEC AUTO: 5.27 X10(3)/MCL (ref 4.5–11.5)

## 2023-07-13 PROCEDURE — 80307 DRUG TEST PRSMV CHEM ANLYZR: CPT | Performed by: NURSE PRACTITIONER

## 2023-07-13 PROCEDURE — 85025 COMPLETE CBC W/AUTO DIFF WBC: CPT | Performed by: NURSE PRACTITIONER

## 2023-07-13 PROCEDURE — 3044F PR MOST RECENT HEMOGLOBIN A1C LEVEL <7.0%: ICD-10-PCS | Mod: CPTII,,, | Performed by: NURSE PRACTITIONER

## 2023-07-13 PROCEDURE — 86592 SYPHILIS TEST NON-TREP QUAL: CPT | Performed by: NURSE PRACTITIONER

## 2023-07-13 PROCEDURE — 86039 ANTINUCLEAR ANTIBODIES (ANA): CPT | Performed by: NURSE PRACTITIONER

## 2023-07-13 PROCEDURE — 87340 HEPATITIS B SURFACE AG IA: CPT | Performed by: NURSE PRACTITIONER

## 2023-07-13 PROCEDURE — 87591 N.GONORRHOEAE DNA AMP PROB: CPT | Mod: 91 | Performed by: NURSE PRACTITIONER

## 2023-07-13 PROCEDURE — 99214 PR OFFICE/OUTPT VISIT, EST, LEVL IV, 30-39 MIN: ICD-10-PCS | Mod: S$PBB,,, | Performed by: NURSE PRACTITIONER

## 2023-07-13 PROCEDURE — 87491 CHLMYD TRACH DNA AMP PROBE: CPT | Performed by: NURSE PRACTITIONER

## 2023-07-13 PROCEDURE — 3008F BODY MASS INDEX DOCD: CPT | Mod: CPTII,,, | Performed by: NURSE PRACTITIONER

## 2023-07-13 PROCEDURE — 3074F SYST BP LT 130 MM HG: CPT | Mod: CPTII,,, | Performed by: NURSE PRACTITIONER

## 2023-07-13 PROCEDURE — 91200 LIVER ELASTOGRAPHY: CPT | Mod: PBBFAC | Performed by: STUDENT IN AN ORGANIZED HEALTH CARE EDUCATION/TRAINING PROGRAM

## 2023-07-13 PROCEDURE — 1160F RVW MEDS BY RX/DR IN RCRD: CPT | Mod: CPTII,,, | Performed by: NURSE PRACTITIONER

## 2023-07-13 PROCEDURE — 1160F PR REVIEW ALL MEDS BY PRESCRIBER/CLIN PHARMACIST DOCUMENTED: ICD-10-PCS | Mod: CPTII,,, | Performed by: NURSE PRACTITIONER

## 2023-07-13 PROCEDURE — 3044F HG A1C LEVEL LT 7.0%: CPT | Mod: CPTII,,, | Performed by: NURSE PRACTITIONER

## 2023-07-13 PROCEDURE — 87522 HEPATITIS C REVRS TRNSCRPJ: CPT | Performed by: NURSE PRACTITIONER

## 2023-07-13 PROCEDURE — 87902 NFCT AGT GNTYP ALYS HEP C: CPT | Performed by: NURSE PRACTITIONER

## 2023-07-13 PROCEDURE — 86708 HEPATITIS A ANTIBODY: CPT | Performed by: NURSE PRACTITIONER

## 2023-07-13 PROCEDURE — 3074F PR MOST RECENT SYSTOLIC BLOOD PRESSURE < 130 MM HG: ICD-10-PCS | Mod: CPTII,,, | Performed by: NURSE PRACTITIONER

## 2023-07-13 PROCEDURE — 99215 OFFICE O/P EST HI 40 MIN: CPT | Mod: PBBFAC | Performed by: NURSE PRACTITIONER

## 2023-07-13 PROCEDURE — 3008F PR BODY MASS INDEX (BMI) DOCUMENTED: ICD-10-PCS | Mod: CPTII,,, | Performed by: NURSE PRACTITIONER

## 2023-07-13 PROCEDURE — 86706 HEP B SURFACE ANTIBODY: CPT | Performed by: NURSE PRACTITIONER

## 2023-07-13 PROCEDURE — 99214 OFFICE O/P EST MOD 30 MIN: CPT | Mod: S$PBB,,, | Performed by: NURSE PRACTITIONER

## 2023-07-13 PROCEDURE — 1159F PR MEDICATION LIST DOCUMENTED IN MEDICAL RECORD: ICD-10-PCS | Mod: CPTII,,, | Performed by: NURSE PRACTITIONER

## 2023-07-13 PROCEDURE — 80053 COMPREHEN METABOLIC PANEL: CPT | Performed by: NURSE PRACTITIONER

## 2023-07-13 PROCEDURE — 1159F MED LIST DOCD IN RCRD: CPT | Mod: CPTII,,, | Performed by: NURSE PRACTITIONER

## 2023-07-13 PROCEDURE — 36415 COLL VENOUS BLD VENIPUNCTURE: CPT | Performed by: NURSE PRACTITIONER

## 2023-07-13 PROCEDURE — 81596 NFCT DS CHRNC HCV 6 ASSAYS: CPT | Performed by: NURSE PRACTITIONER

## 2023-07-13 PROCEDURE — 86704 HEP B CORE ANTIBODY TOTAL: CPT | Performed by: NURSE PRACTITIONER

## 2023-07-13 PROCEDURE — 3079F DIAST BP 80-89 MM HG: CPT | Mod: CPTII,,, | Performed by: NURSE PRACTITIONER

## 2023-07-13 PROCEDURE — 82728 ASSAY OF FERRITIN: CPT | Performed by: NURSE PRACTITIONER

## 2023-07-13 PROCEDURE — 87536 HIV-1 QUANT&REVRSE TRNSCRPJ: CPT | Performed by: NURSE PRACTITIONER

## 2023-07-13 PROCEDURE — 3079F PR MOST RECENT DIASTOLIC BLOOD PRESSURE 80-89 MM HG: ICD-10-PCS | Mod: CPTII,,, | Performed by: NURSE PRACTITIONER

## 2023-07-13 PROCEDURE — 86780 TREPONEMA PALLIDUM: CPT | Performed by: NURSE PRACTITIONER

## 2023-07-13 PROCEDURE — 85610 PROTHROMBIN TIME: CPT | Performed by: NURSE PRACTITIONER

## 2023-07-13 RX ORDER — BICTEGRAVIR SODIUM, EMTRICITABINE, AND TENOFOVIR ALAFENAMIDE FUMARATE 50; 200; 25 MG/1; MG/1; MG/1
1 TABLET ORAL DAILY
Qty: 90 TABLET | Refills: 1 | Status: SHIPPED | OUTPATIENT
Start: 2023-07-13

## 2023-07-13 RX ORDER — ERGOCALCIFEROL 1.25 MG/1
50000 CAPSULE ORAL
Qty: 12 CAPSULE | Refills: 1 | Status: SHIPPED | OUTPATIENT
Start: 2023-07-13 | End: 2024-03-18

## 2023-07-13 NOTE — PROGRESS NOTES
Results reviewed.  Please call pt with results.      Anal swab + for chlamydia.  Will need treatment with Doxycycline 100 mg po bid x 7 days.  Please find out which pharmacy is preferred for this prescription.  Also, partner(s) should be notified & seek treatment.     UDS still positive for fentanyl & amphetamines.  Please notify pt as requested.     Thank you.

## 2023-07-13 NOTE — PROGRESS NOTES
Subjective     Patient ID: Ezra Kennedy is a 38 y.o. male.    Chief Complaint: Followup HIV (States just out of hospital, [facility], for accidental drug overdose. States has not touched anything since)    7/13/23  Jaziel is a 37 yo trans male to female here today for HIV f/u visit.  Reports 100% adherent to Biktarvy, tolerates well. Labs 3/23 VL UD, CD4 775, AST 71, , RPR 32 dils, CT/GC negative, HCV RNA 2.13 mil copies. RUQ abd u/s 5/18/23, steatosis noted. Will get FibroScan & hep C pre-treatment labs done today.  Tells me that was admitted to [facility] this weekend with drug overdose. Thought that they were snorting some cocaine & went unresponsive within 10 minutes. Was thankful that friend was there to call EMS & start compressions immediately.  Reports resuscitation x 2, Narcan for given for UDS + for heroin & fentanyl as reported. Requests repeat UDS today to see if completely cleared at this point. Is still very shaken up by the events of the weekend & is really ready to re-evaluate lifestyle choices. Support offered.     3/31/23  Jaziel is a 37 yo transgender male to female here today for HIV f/u visit.  Taking Biktarvy daily and tolerates it well.  Labs collected 3/30/23 VL & Cd4 pending.  Not currently on estrogen therapy, but is taking testosterone blockers.  Exposed to syphilis by partner, RPR titer increased from 16 dils 5/22 to 32 dils 3/20/23.  Will treat today with Bicillin 2.4 mil units IM x 1 dose.  Not currently taking vitamin d replacement, will order.  Vit D level 20.  Did relapse with meth use (snort/smoke) this past year but has cleaned up & is feeling much better overall.  All questions answered & concerns addressed.     5/24/22  Jaziel is a 38 yo transgender male to female presenting today for HIV f/u visit.  Not currently on hormone therapy but plans to establish care with Aysha Walker at [facility].  Reports 100% adherent to Biktarvy, tolerates well.  Labs  collected this am, last resulted labs 11/21 VL <20, CD4 875.  Treated for syphilis 8/21 with baseline titer of 128 dils, decreased to 64 dils 11/21.  Collected titer today, results pending.  Tells me that they are monogamous with Shant & declines need for repeat STI screening today.  Recently treated for STI along with Shant for known exposure.  Will screen next visit.  Doing ok overall, but did just lose a friend to likely drug overdose last week.  Mourning the loss.  All questions answered & concerns addressed.     Review of Systems   Constitutional: Negative.    HENT: Negative.     Respiratory: Negative.     Cardiovascular: Negative.    Gastrointestinal: Negative.    Genitourinary: Negative.    Integumentary:  Negative.   Neurological: Negative.    Hematological: Negative.    Psychiatric/Behavioral: Negative.          Objective     Physical Exam  Vitals reviewed.   Constitutional:       General: He is not in acute distress.     Appearance: Normal appearance. He is not toxic-appearing.   Eyes:      General: No scleral icterus.  Cardiovascular:      Rate and Rhythm: Normal rate and regular rhythm.      Heart sounds: Normal heart sounds.   Pulmonary:      Effort: Pulmonary effort is normal. No respiratory distress.      Breath sounds: Normal breath sounds.   Abdominal:      General: Bowel sounds are normal. There is no distension.      Palpations: Abdomen is soft. There is no mass.      Tenderness: There is no abdominal tenderness.   Musculoskeletal:         General: Normal range of motion.   Skin:     General: Skin is warm and dry.   Neurological:      Mental Status: He is alert and oriented to person, place, and time.          Assessment and Plan     1. HIV disease  -     BIKTARVY -25 mg (25 kg or greater); Take 1 tablet by mouth once daily.  Dispense: 90 tablet; Refill: 1  -     Comprehensive Metabolic Panel  -     CBC Auto Differential; Future; Expected date: 07/13/2023  -     HIV-1 RNA, Quantitative, PCR  with Reflex to Genotype; Future; Expected date: 07/13/2023  Adherence and sexual health counseling done.  Use condoms for all sexual encounters.  Blood precautions.   Continue Biktarvy as directed.  Labs today.  RTC 1 month with Alanna.     HIV Wellness:  Anal pap: 8/21 NIL  Oral CT/GC: 8/21 neg, 7/23  Anal CT/GC: 8/21 neg, 7/23  Urine CT/GC: 8/21 neg, 7/23  RPR: 11/21 64 dils, 3/23 32 dils, 7/23  Ophth: will schedule appt (5/22)    2. Vitamin D deficiency  -     ergocalciferol (ERGOCALCIFEROL) 50,000 unit Cap; Take 1 capsule (50,000 Units total) by mouth every 7 days.  Dispense: 12 capsule; Refill: 1  Continue Vitamin d2 weekly.      3. Chronic hepatitis C without hepatic coma  -     US Elastography Liver w/imaging; Future; Expected date: 07/13/2023  -     Protime-INR; Future; Expected date: 07/13/2023  -     ABIGAIL IgG by IFA; Future; Expected date: 07/13/2023  -     Drug Screen, Urine  Diagnosed 3/2023  Treatment naive.  GT pending, baseline VL 2.13 mil copies  Fibrosure: 7/13/23  FibroScan 7/13/23.  RUQ abdominal u/s: 5/23 steatosis noted.  Blood precautions: do not share a razor, needle, toothbrush, clippers with anyone.  RTC approximately 4 weeks with Alanna to review results & initiate treatment plan.     4. History of syphilis  -     SYPHILIS ANTIBODY (WITH REFLEX RPR); Future; Expected date: 07/13/2023  -     Hepatitis C Virus Quantitative; Future; Expected date: 07/13/2023  -     Hepatitis C Genotype; Future; Expected date: 07/13/2023  -     Fibrotest-Actitest, Serum; Future; Expected date: 07/13/2023  -     Ferritin; Future; Expected date: 07/13/2023  -     Hepatitis B Core Antibody, Total; Future; Expected date: 07/13/2023  -     Hepatitis A antibody, IgG; Future; Expected date: 07/13/2023  -     Hepatitis B Surface Ab, Qualitative; Future; Expected date: 07/13/2023  -     Hepatitis B Surface Antigen; Future; Expected date: 07/13/2023  -     RPR  History: Titer 8/21 128 dils treated, 11/21 64 dils, 05/22   16 dils  3/23 32 dils (Bicillin 2.4 mil units IM)  Repeat titer today.     5. Routine screening for STI (sexually transmitted infection)  -     Chlamydia/GC, PCR  -     C.trach/N.gonor AMP RNA; Future; Expected date: 07/13/2023  -     Chlamydia/GC, PCR  Oral, anal, urine samples for ct/gc today.

## 2023-07-13 NOTE — PROCEDURES
Fibroscan Procedure     Name: Ezra Kennedy  Date of Procedure :   Interpreting Physician: Alen Nova MD  Diagnosis: HIV/HCV    Probe: M    Fibroscan readin.0 kPa    Fibrosis: F2     CAP readin dB/m    Steatosis: S0      Miscellaneous:

## 2023-07-14 ENCOUNTER — TELEPHONE (OUTPATIENT)
Dept: INFECTIOUS DISEASES | Facility: CLINIC | Age: 38
End: 2023-07-14
Payer: MEDICAID

## 2023-07-14 DIAGNOSIS — A74.9 CHLAMYDIA INFECTION: Primary | ICD-10-CM

## 2023-07-14 LAB
HCV GENTYP SERPL NAA+PROBE: 3
HCV RNA SERPL NAA+PROBE-ACNC: ABNORMAL IU/ML
HIV1 RNA # PLAS NAA DL=20: NORMAL COPIES/ML
RPR SER QL: REACTIVE
RPR SER-TITR: ABNORMAL {TITER}

## 2023-07-14 NOTE — TELEPHONE ENCOUNTER
----- Message from Negin Melendez sent at 7/14/2023  1:10 PM CDT -----  Regarding: returning call  #VALENTIN#    Pt returning call from jannie     Please call him back at [phone]

## 2023-07-14 NOTE — TELEPHONE ENCOUNTER
Patient contacted clinic. Results discussed. Also states that mother can know all results if needed. Requests rx to be sent to [facility] on [address]. Voiced understanding.

## 2023-07-14 NOTE — TELEPHONE ENCOUNTER
----- Message from LIZABETH Shukla sent at 7/13/2023  3:29 PM CDT -----  Results reviewed.  Please call pt with results.      Anal swab + for chlamydia.  Will need treatment with Doxycycline 100 mg po bid x 7 days.  Please find out which pharmacy is preferred for this prescription.  Also, partner(s) should be notified & seek treatment.     UDS still positive for fentanyl & amphetamines.  Please notify pt as requested.     Thank you.

## 2023-07-15 LAB
ANA PAT SER IF-IMP: ABNORMAL
ANA SER QL HEP2 SUBST: ABNORMAL
ANA TITR SER HEP2 SUBST: ABNORMAL {TITER}

## 2023-07-16 LAB
C TRACH RRNA SPEC QL NAA+PROBE: POSITIVE
N GONORRHOEA RRNA SPEC QL NAA+PROBE: NEGATIVE
SPECIMEN SOURCE: ABNORMAL
SPECIMEN SOURCE: ABNORMAL

## 2023-07-17 ENCOUNTER — DOCUMENTATION ONLY (OUTPATIENT)
Dept: INFECTIOUS DISEASES | Facility: CLINIC | Age: 38
End: 2023-07-17
Payer: MEDICAID

## 2023-07-17 LAB
A2 MACROGLOB SERPL-MCNC: 201 MG/DL (ref 100–280)
ALT SERPL W P-5'-P-CCNC: 182 U/L (ref 7–55)
ANNOTATION COMMENT IMP: ABNORMAL
APO A-I SERPL-MCNC: 127 MG/DL
BILIRUB SERPL-MCNC: 0.7 MG/DL
FIBROSIS STAGE SERPL QL: ABNORMAL
GGT SERPL-CCNC: 18 U/L (ref 8–61)
HAPTOGLOB SERPL NEPH-MCNC: 117 MG/DL (ref 30–200)
LIVER FIBR SCORE SERPL CALC.FIBROSURE: 0.22
LIVER FIBROSIS INTERPRETATION SER-IMP: ABNORMAL
NECROINFLAMMATORY ACT GRADE SERPL QL: ABNORMAL
NECROINFLAMMATORY ACT SCORE SERPL: 0.76
NECROINFLAMMATORY ACTIV INTERP SER-IMP: ABNORMAL
SERIAL #: ABNORMAL

## 2023-07-17 RX ORDER — DOXYCYCLINE HYCLATE 100 MG
100 TABLET ORAL 2 TIMES DAILY
COMMUNITY
End: 2023-07-17 | Stop reason: SDUPTHER

## 2023-07-17 RX ORDER — DOXYCYCLINE HYCLATE 100 MG
100 TABLET ORAL 2 TIMES DAILY
Qty: 28 TABLET | Refills: 0 | Status: SHIPPED | OUTPATIENT
Start: 2023-07-17 | End: 2023-08-15

## 2023-07-17 NOTE — PROGRESS NOTES
Phoned patient. Informed of appointment with Internal Medicine clinic 08/16/2023 at 12:45p.m. Voiced understanding.

## 2023-07-17 NOTE — TELEPHONE ENCOUNTER
Doxycycline rx needed to be sent to [facility] on [address]. Rx proposed.

## 2023-07-17 NOTE — TELEPHONE ENCOUNTER
Phoned patient. Discussed the following: RPR remains at 32 dils.  Will increase duration of doxycyline to 100 mg bid x 14 days to cover for syphilis and oral/anal chlamydia.  Prescription revised & sent. Voiced understanding. Appreciates call.

## 2023-07-17 NOTE — TELEPHONE ENCOUNTER
RPR remains at 32 dils.  Will increase duration of doxycyline to 100 mg bid x 14 days to cover for syphilis and oral/anal chlamydia.  Prescription revised & sent. Please notify pt. Thank you.

## 2023-07-24 ENCOUNTER — TELEPHONE (OUTPATIENT)
Dept: INFECTIOUS DISEASES | Facility: CLINIC | Age: 38
End: 2023-07-24
Payer: MEDICAID

## 2023-07-24 NOTE — TELEPHONE ENCOUNTER
----- Message from Traci Lees sent at 7/21/2023  3:44 PM CDT -----  Pt is in need of a call. Pt can be reached at [phone]    Thank You

## 2023-07-25 NOTE — TELEPHONE ENCOUNTER
I believe that they can just go to the pharmacy and request it.  Please confirm before relaying this information to pt. Thank you.

## 2023-07-25 NOTE — TELEPHONE ENCOUNTER
Phoned patient. Informed that Narcan is free at any pharmacy. To check with pharmacy. To contact clinic with any problem.

## 2023-07-25 NOTE — TELEPHONE ENCOUNTER
"Phoned patient. States not "using" anymore but questioning how to get a prescription of Narcan to keep on his person. States former S/O still using and is just scared because of previous hospital occurrence. Please advise.   "

## 2023-08-15 ENCOUNTER — OFFICE VISIT (OUTPATIENT)
Dept: INFECTIOUS DISEASES | Facility: CLINIC | Age: 38
End: 2023-08-15
Payer: MEDICAID

## 2023-08-15 VITALS
TEMPERATURE: 98 F | DIASTOLIC BLOOD PRESSURE: 74 MMHG | HEART RATE: 74 BPM | BODY MASS INDEX: 18.62 KG/M2 | HEIGHT: 68 IN | RESPIRATION RATE: 16 BRPM | WEIGHT: 122.88 LBS | SYSTOLIC BLOOD PRESSURE: 110 MMHG

## 2023-08-15 DIAGNOSIS — B18.2 CHRONIC HEPATITIS C WITHOUT HEPATIC COMA: ICD-10-CM

## 2023-08-15 DIAGNOSIS — Z86.19 HISTORY OF SYPHILIS: ICD-10-CM

## 2023-08-15 DIAGNOSIS — B20 HIV DISEASE: Primary | ICD-10-CM

## 2023-08-15 DIAGNOSIS — A74.9 CHLAMYDIA INFECTION: ICD-10-CM

## 2023-08-15 LAB
AMPHET UR QL SCN: POSITIVE
BARBITURATE SCN PRESENT UR: NEGATIVE
BENZODIAZ UR QL SCN: NEGATIVE
C TRACH DNA SPEC QL NAA+PROBE: NOT DETECTED
CANNABINOIDS UR QL SCN: POSITIVE
COCAINE UR QL SCN: NEGATIVE
FENTANYL UR QL SCN: NEGATIVE
MDMA UR QL SCN: NEGATIVE
N GONORRHOEA DNA SPEC QL NAA+PROBE: NOT DETECTED
OPIATES UR QL SCN: NEGATIVE
PCP UR QL: NEGATIVE
PH UR: 6.5 [PH] (ref 3–11)
SOURCE (OHS): NORMAL

## 2023-08-15 PROCEDURE — 99214 OFFICE O/P EST MOD 30 MIN: CPT | Mod: S$PBB,,, | Performed by: NURSE PRACTITIONER

## 2023-08-15 PROCEDURE — 3008F PR BODY MASS INDEX (BMI) DOCUMENTED: ICD-10-PCS | Mod: CPTII,,, | Performed by: NURSE PRACTITIONER

## 2023-08-15 PROCEDURE — 1159F MED LIST DOCD IN RCRD: CPT | Mod: CPTII,,, | Performed by: NURSE PRACTITIONER

## 2023-08-15 PROCEDURE — 3078F PR MOST RECENT DIASTOLIC BLOOD PRESSURE < 80 MM HG: ICD-10-PCS | Mod: CPTII,,, | Performed by: NURSE PRACTITIONER

## 2023-08-15 PROCEDURE — 1159F PR MEDICATION LIST DOCUMENTED IN MEDICAL RECORD: ICD-10-PCS | Mod: CPTII,,, | Performed by: NURSE PRACTITIONER

## 2023-08-15 PROCEDURE — 3074F PR MOST RECENT SYSTOLIC BLOOD PRESSURE < 130 MM HG: ICD-10-PCS | Mod: CPTII,,, | Performed by: NURSE PRACTITIONER

## 2023-08-15 PROCEDURE — 3044F HG A1C LEVEL LT 7.0%: CPT | Mod: CPTII,,, | Performed by: NURSE PRACTITIONER

## 2023-08-15 PROCEDURE — 3074F SYST BP LT 130 MM HG: CPT | Mod: CPTII,,, | Performed by: NURSE PRACTITIONER

## 2023-08-15 PROCEDURE — 99214 PR OFFICE/OUTPT VISIT, EST, LEVL IV, 30-39 MIN: ICD-10-PCS | Mod: S$PBB,,, | Performed by: NURSE PRACTITIONER

## 2023-08-15 PROCEDURE — 1160F PR REVIEW ALL MEDS BY PRESCRIBER/CLIN PHARMACIST DOCUMENTED: ICD-10-PCS | Mod: CPTII,,, | Performed by: NURSE PRACTITIONER

## 2023-08-15 PROCEDURE — 1160F RVW MEDS BY RX/DR IN RCRD: CPT | Mod: CPTII,,, | Performed by: NURSE PRACTITIONER

## 2023-08-15 PROCEDURE — 99214 OFFICE O/P EST MOD 30 MIN: CPT | Mod: PBBFAC | Performed by: NURSE PRACTITIONER

## 2023-08-15 PROCEDURE — 3008F BODY MASS INDEX DOCD: CPT | Mod: CPTII,,, | Performed by: NURSE PRACTITIONER

## 2023-08-15 PROCEDURE — 80307 DRUG TEST PRSMV CHEM ANLYZR: CPT | Performed by: NURSE PRACTITIONER

## 2023-08-15 PROCEDURE — 3078F DIAST BP <80 MM HG: CPT | Mod: CPTII,,, | Performed by: NURSE PRACTITIONER

## 2023-08-15 PROCEDURE — 87591 N.GONORRHOEAE DNA AMP PROB: CPT | Performed by: NURSE PRACTITIONER

## 2023-08-15 PROCEDURE — 3044F PR MOST RECENT HEMOGLOBIN A1C LEVEL <7.0%: ICD-10-PCS | Mod: CPTII,,, | Performed by: NURSE PRACTITIONER

## 2023-08-15 RX ORDER — VELPATASVIR AND SOFOSBUVIR 100; 400 MG/1; MG/1
1 TABLET, FILM COATED ORAL DAILY
Qty: 84 TABLET | Refills: 0 | Status: SHIPPED | OUTPATIENT
Start: 2023-08-15

## 2023-08-15 NOTE — PROGRESS NOTES
Patient ID: Ezra Kennedy 38 y.o.     Chief Complaint:   Chief Complaint   Patient presents with    Followup HIV      Denies problems        HPI:  8/15/23  Jaziel is a 37 yo white transgender male to female presenting today for HCV/HIV f/u visit. HIV virally suppressed on Biktarvy, labs 7/13/23 VL UD, 3/23 Cd4 775.  HCV VL repeated 7/23, 1.38 mil copies. Fibrosure 7/23 A3, F0-1.  Fibroscan 7/23 S0, F2.  AST 96, .  RPR titer remained at 32 dils & anal swab resulted positive for chlamydia.  Completed 2 weeks of doxycycline as prescribed. Will repeat anal swab today and RPR titer next visit. OD situation occurred 7/9/23, requests repeat UDS to confirm clearance. Cutting back on cigarettes & marijuana.  Doing much better overall & has no concerns today. Ready to start HCV treatment & agrees to adhere to recommended treatment plan.     7/13/23  Jaziel is a 37 yo trans male to female here today for HIV f/u visit.  Reports 100% adherent to Biktarvy, tolerates well. Labs 3/23 VL UD, CD4 775, AST 71, , RPR 32 dils, CT/GC negative, HCV RNA 2.13 mil copies. RUQ abd u/s 5/18/23, steatosis noted. Will get FibroScan & hep C pre-treatment labs done today.  Tells me that was admitted to [facility] this weekend with drug overdose. Thought that they were snorting some cocaine & went unresponsive within 10 minutes. Was thankful that friend was there to call EMS & start compressions immediately.  Reports resuscitation x 2, Narcan for given for UDS + for heroin & fentanyl as reported. Requests repeat UDS today to see if completely cleared at this point. Is still very shaken up by the events of the weekend & is really ready to re-evaluate lifestyle choices. Support offered.      3/31/23  Jaziel is a 37 yo transgender male to female here today for HIV f/u visit.  Taking Biktarvy daily and tolerates it well.  Labs collected 3/30/23 VL & Cd4 pending.  Not currently on estrogen therapy, but is taking testosterone  blockers.  Exposed to syphilis by partner, RPR titer increased from 16 dils 5/22 to 32 dils 3/20/23.  Will treat today with Bicillin 2.4 mil units IM x 1 dose.  Not currently taking vitamin d replacement, will order.  Vit D level 20.  Did relapse with meth use (snort/smoke) this past year but has cleaned up & is feeling much better overall.  All questions answered & concerns addressed.      Past Medical History:   Diagnosis Date    Bipolar disorder     Depression     GERD (gastroesophageal reflux disease)     HIV infection         History reviewed. No pertinent surgical history.     Social History     Socioeconomic History    Marital status: Single   Tobacco Use    Smoking status: Every Day     Current packs/day: 0.25     Average packs/day: 0.3 packs/day for 21.6 years (5.4 ttl pk-yrs)     Types: Cigarettes     Start date: 2002    Smokeless tobacco: Never   Substance and Sexual Activity    Alcohol use: Yes     Comment: occassionally    Drug use: Yes     Types: Marijuana, Cocaine    Sexual activity: Not Currently     Partners: Male     Comment: Not having sex for while        Family History   Problem Relation Age of Onset    Arthritis Mother     COPD Mother     Lung cancer Mother     Fibromyalgia Mother     Hypertension Father     Hyperlipidemia Father     Mental illness Brother     Alcohol abuse Brother         Review of patient's allergies indicates:  No Known Allergies     Immunization History   Administered Date(s) Administered    COVID-19, MRNA, LN-S, PF (Pfizer) (Purple Cap) 08/31/2021, 09/21/2021    HPV 9-Valent 04/22/2019, 07/30/2019, 10/01/2020    Hepatitis A, Adult 07/09/2013    Hepatitis B, Adult 07/13/2012    Hepatitis B, Pediatric/Adolescent 12/06/1999    Influenza - Quadrivalent 10/01/2020, 11/17/2021    Influenza - Quadrivalent - PF (6-35 months) 12/17/2018    Influenza - Quadrivalent - PF *Preferred* (6 months and older) 12/06/1999, 12/12/2002, 09/26/2017    Influenza - Trivalent - PF (ADULT)  "12/06/1999, 12/12/2002, 11/19/2013, 10/13/2014, 11/01/2016    Influenza A (H1N1) 2009 Monovalent - IM 12/07/2009    Meningococcal Conjugate (MCV4P) 08/24/2018, 12/17/2018    Pneumococcal Conjugate - 13 Valent 09/09/2014    Pneumococcal Polysaccharide - 23 Valent 08/07/2012, 10/02/2017, 10/02/2017    Td (Adult), Unspecified Formulation 12/06/1999    Tdap 10/13/2014, 06/14/2020        Review of Systems   Constitutional: Negative.    HENT: Negative.     Eyes: Negative.    Respiratory: Negative.     Cardiovascular: Negative.    Gastrointestinal: Negative.    Genitourinary: Negative.    Musculoskeletal: Negative.    Skin: Negative.    Neurological: Negative.    Endo/Heme/Allergies: Negative.    Psychiatric/Behavioral: Negative.     All other systems reviewed and are negative.         Objective:      /74 (BP Location: Right arm, Patient Position: Sitting, BP Method: Medium (Automatic))   Pulse 74   Temp 98.1 °F (36.7 °C) (Oral)   Resp 16   Ht 5' 8" (1.727 m)   Wt 55.7 kg (122 lb 14.4 oz)   BMI 18.69 kg/m²      Physical Exam  Vitals reviewed.   Constitutional:       General: He is not in acute distress.     Appearance: Normal appearance. He is not toxic-appearing.   Eyes:      General: No scleral icterus.  Cardiovascular:      Rate and Rhythm: Normal rate and regular rhythm.      Heart sounds: Normal heart sounds.   Pulmonary:      Effort: Pulmonary effort is normal. No respiratory distress.      Breath sounds: Normal breath sounds.   Abdominal:      General: Bowel sounds are normal. There is no distension.      Palpations: Abdomen is soft. There is no mass.      Tenderness: There is no abdominal tenderness.   Musculoskeletal:         General: Normal range of motion.   Skin:     General: Skin is warm and dry.   Neurological:      Mental Status: He is alert and oriented to person, place, and time.          Labs: Reviewed most recent relevant labs available, notable results highlighted in this note    Imaging: " Reviewed most recent relevant imaging studies available, notable results highlighted in this note      Medications:     Current Outpatient Medications   Medication Instructions    albuterol (PROVENTIL/VENTOLIN HFA) 90 mcg/actuation inhaler 2 puffs, Inhalation    ARIPiprazole (ABILIFY) 5 MG Tab SMARTSI Tablet(s) By Mouth Every Evening    BIKTARVY -25 mg (25 kg or greater) 1 tablet, Oral, Daily    cetirizine (ZYRTEC) 10 mg, Oral, Daily    doxepin (SINEQUAN) 10 mg, Oral, Nightly    ergocalciferol (ERGOCALCIFEROL) 50,000 Units, Oral, Every 7 days    gabapentin (NEURONTIN) 600 mg, Oral, 2 times daily PRN    hydrOXYzine pamoate (VISTARIL) 50 mg, Oral, 2 times daily    ibuprofen (ADVIL,MOTRIN) 800 mg, Oral, 2 times daily PRN    levocetirizine (XYZAL) 5 mg, Oral, Nightly    ondansetron (ZOFRAN-ODT) 8 mg, Oral    pantoprazole (PROTONIX) 40 mg, Oral, Daily, HOLD while on Epclusa    prazosin (MINIPRESS) 2 mg, Oral, Nightly    rizatriptan (MAXALT) 5 mg, Oral, 2 times daily PRN    sofosbuvir-velpatasvir (EPCLUSA) 400-100 mg Tab 1 tablet, Oral, Daily    spironolactone (ALDACTONE) 50 mg, Oral, 2 times daily       Assessment:       Problem List Items Addressed This Visit          ID    HIV disease - Primary    Relevant Orders    Drug Screen, Urine     Other Visit Diagnoses       Chronic hepatitis C without hepatic coma        Relevant Medications    sofosbuvir-velpatasvir (EPCLUSA) 400-100 mg Tab    Chlamydia infection        Relevant Orders    Chlamydia/GC, PCR    History of syphilis                   Plan:      HIV disease  -     Drug Screen, Urine  Adherence and sexual health counseling done.  Use condoms for all sexual encounters.  Blood precautions.   Continue Biktarvy as directed.  RTC approximately 2 months with Alanna.     HIV Wellness:  Anal pap:  NIL  Oral CT/GC:  neg,  Neg  Anal CT/GC:  + chlam,   Urine CT/GC:  Neg  RPR:  64 dils, 3/23 32 dils, 7/23 32 dils  Ophth: will schedule appt  (5/22)     Chronic hepatitis C without hepatic coma  -     sofosbuvir-velpatasvir (EPCLUSA) 400-100 mg Tab; Take 1 tablet by mouth once daily.  Dispense: 84 tablet; Refill: 0  Diagnosed 3/2023  Treatment naive.  GT 3, baseline VL 1.38 mil  Fibrosure: 7/23 A3, F0-1.  FibroScan 7/23 S0, F2.  RUQ abdominal u/s: 5/23 steatosis noted.  Blood precautions: do not share a razor, needle, toothbrush, clippers with anyone.  Epclusa 1 po daily x 12 weeks.   Refer to HCV  to initiate PA & treatment protocol.   RTC approximately 6-8 weeks with Alanna.    Chlamydia infection  -     Chlamydia/GC, PCR  Anal chlamydia treated with Doxycycline.  Repeat swab today.     History of syphilis  History: Titer 8/21 128 dils treated, 11/21 64 dils, 05/22  16 dils  3/23 32 dils (Bicillin 2.4 mil units IM)  7/23 32 dils (Doxycycline 100 mg bid x 14 days)  Repeat titer 10/23.

## 2023-08-16 LAB
INR PPP: 0.9
PROTHROMBIN TIME: 12.2 SECONDS (ref 11.4–14)

## 2023-08-18 ENCOUNTER — TELEPHONE (OUTPATIENT)
Dept: INFECTIOUS DISEASES | Facility: CLINIC | Age: 38
End: 2023-08-18
Payer: MEDICAID

## 2023-08-18 NOTE — TELEPHONE ENCOUNTER
----- Message from LIZABETH Shukla sent at 8/17/2023  3:59 PM CDT -----  Results reviewed. Please call pt with results as requested.

## 2023-08-22 ENCOUNTER — TELEPHONE (OUTPATIENT)
Dept: INFECTIOUS DISEASES | Facility: CLINIC | Age: 38
End: 2023-08-22
Payer: MEDICAID

## 2023-08-22 NOTE — TELEPHONE ENCOUNTER
----- Message from Idalia Ramirez sent at 8/22/2023  9:57 AM CDT -----  Regarding: Results  VALENTIN PT       Pt called to give new lab results and stated that his Titer went down

## 2023-08-22 NOTE — TELEPHONE ENCOUNTER
Phoned patient. [facility] had an appt with [facility] and they did redraw labs and titer  result was 1:8. Wanted to let Mrs. Mondragon know results. Informed that information would be forwarded to provider. Appreciates call.

## 2023-09-15 ENCOUNTER — TELEPHONE (OUTPATIENT)
Dept: INFECTIOUS DISEASES | Facility: CLINIC | Age: 38
End: 2023-09-15
Payer: MEDICAID

## 2023-09-15 NOTE — TELEPHONE ENCOUNTER
Called [phone] and [phone]  Number not in service, no option to leave voice mail. Will mail letter for pt to call office.

## 2023-09-15 NOTE — LETTER
September 15, 2023    Ezra Kennedy  [address] [place] [address] [facility]  [address] [facility] [address]  Phone: [phone]   We have been unable to reach you by phone. Please contact our office at  [phone]. Regarding medication.            Sincerely,     [facility]

## 2023-09-18 ENCOUNTER — TELEPHONE (OUTPATIENT)
Dept: INFECTIOUS DISEASES | Facility: CLINIC | Age: 38
End: 2023-09-18
Payer: MEDICAID

## 2023-09-18 NOTE — TELEPHONE ENCOUNTER
Attempted to contact pt to let him know [facility]  [facility] without any response, but phone is not working at this time.  I called pts mother as listed on contacts, I gave her a message to have pt contact clinic when she see him, mother verbalizes understanding

## 2023-09-21 NOTE — TELEPHONE ENCOUNTER
Jess Gilbert Kasi, LPN  Caller: Unspecified (Today,  7:29 AM)  Patient of Jo Karla     From: Keron Marcia    Phone: [phone]    Patient: Keron Kennedy    : 87    Ref: Returning call from [place] .Would   like a call back     [facility] ID: [phone]     Attempted to contact pt, no answer, left message for return call

## 2023-09-22 NOTE — TELEPHONE ENCOUNTER
FYI: Pt returned call and was informed that [facility] has been trying to reach him to  meds, he will pick it up today and call us Monday with a start date /teaching and to see if he needs to keep his appt on 9/29/23 with Alanna or reschedule.

## 2023-09-25 NOTE — TELEPHONE ENCOUNTER
Return call received from pt. The following information given  for HCV treatment education. The following instructions were given to pt:  Start generic Epclusa 400/100mg 1 tablet by mouth daily, preferably with evening meal.  Drink plenty of fluids.  Most common side effects are headache and fatigue, which should get better once taking medication.  Avoid alcohol.  Notify clinic before starting any new prescriptions or over the counter medications.  If a dose is missed do not double up, continue with next scheduled dose or contact clinic.   Contact [facility] @ [phone] 1 week prior to needing refill.  Appt date and time for lab order and follow up appts mailed to pt.  [facility]clusa [facility]  Copy of above information mailed to pt    Pt verbalizes understanding of all above instructions.

## 2023-09-25 NOTE — TELEPHONE ENCOUNTER
Attempted to contact pt, no answer, phone is still not working.  Need to see if pt picked up medication and has he started. Appt for 9/29/2023 has been cancelled and will need to be rescheduled according to when pt begins hcv treatment

## 2023-09-25 NOTE — TELEPHONE ENCOUNTER
Idalia Ramirez routed conversation to You 1 hour ago (12:15 PM)     Ezra Kennedy [phone]  Idalia Ramirez 1 hour ago (12:15 PM)       Pt LVM returning the call    Incoming call        Attempted to contact pt, phone is not working at this time

## 2023-09-28 DIAGNOSIS — B18.2 CHRONIC HEPATITIS C WITHOUT HEPATIC COMA: Primary | ICD-10-CM

## 2023-09-28 NOTE — TELEPHONE ENCOUNTER
Information was mailed to   [address] [facility] [place]. [phone] per pt request

## 2024-03-18 DIAGNOSIS — E55.9 VITAMIN D DEFICIENCY: ICD-10-CM

## 2024-03-18 RX ORDER — ERGOCALCIFEROL 1.25 MG/1
50000 CAPSULE ORAL
Qty: 4 CAPSULE | Refills: 0 | Status: SHIPPED | OUTPATIENT
Start: 2024-03-18 | End: 2024-05-07

## 2024-05-07 DIAGNOSIS — E55.9 VITAMIN D DEFICIENCY: ICD-10-CM

## 2024-05-07 DIAGNOSIS — B20 HIV DISEASE: ICD-10-CM

## 2024-05-07 RX ORDER — ERGOCALCIFEROL 1.25 MG/1
50000 CAPSULE ORAL
Qty: 4 CAPSULE | Refills: 0 | Status: SHIPPED | OUTPATIENT
Start: 2024-05-07

## 2024-05-07 RX ORDER — BICTEGRAVIR SODIUM, EMTRICITABINE, AND TENOFOVIR ALAFENAMIDE FUMARATE 50; 200; 25 MG/1; MG/1; MG/1
1 TABLET ORAL
Qty: 30 TABLET | Refills: 0 | Status: SHIPPED | OUTPATIENT
Start: 2024-05-07

## 2025-08-26 ENCOUNTER — TELEPHONE (OUTPATIENT)
Dept: INFECTIOUS DISEASES | Facility: CLINIC | Age: 40
End: 2025-08-26
Payer: MEDICAID

## 2025-08-26 DIAGNOSIS — B20 HIV DISEASE: Primary | ICD-10-CM

## 2025-08-26 DIAGNOSIS — B18.2 CHRONIC HEPATITIS C WITHOUT HEPATIC COMA: ICD-10-CM

## 2025-08-26 DIAGNOSIS — E55.9 VITAMIN D DEFICIENCY: ICD-10-CM

## 2025-08-28 ENCOUNTER — OFFICE VISIT (OUTPATIENT)
Dept: INFECTIOUS DISEASES | Facility: CLINIC | Age: 40
End: 2025-08-28
Payer: MEDICAID

## 2025-08-28 VITALS
SYSTOLIC BLOOD PRESSURE: 106 MMHG | BODY MASS INDEX: 18.01 KG/M2 | HEART RATE: 87 BPM | DIASTOLIC BLOOD PRESSURE: 70 MMHG | HEIGHT: 68 IN | TEMPERATURE: 98 F | RESPIRATION RATE: 10 BRPM | WEIGHT: 118.81 LBS

## 2025-08-28 DIAGNOSIS — Z11.3 ROUTINE SCREENING FOR STI (SEXUALLY TRANSMITTED INFECTION): ICD-10-CM

## 2025-08-28 DIAGNOSIS — Z86.19 HISTORY OF SYPHILIS: ICD-10-CM

## 2025-08-28 DIAGNOSIS — B18.2 CHRONIC HEPATITIS C WITHOUT HEPATIC COMA: ICD-10-CM

## 2025-08-28 DIAGNOSIS — Z12.9 CANCER SCREENING: ICD-10-CM

## 2025-08-28 DIAGNOSIS — Z21 ASYMPTOMATIC HIV INFECTION, WITH NO HISTORY OF HIV-RELATED ILLNESS: Primary | ICD-10-CM

## 2025-08-28 LAB
BACTERIA #/AREA URNS AUTO: ABNORMAL /HPF
BILIRUB UR QL STRIP.AUTO: NEGATIVE
C TRACH DNA SPEC QL NAA+PROBE: NOT DETECTED
C TRACH DNA SPEC QL NAA+PROBE: NOT DETECTED
CLARITY UR: ABNORMAL
COLOR UR AUTO: YELLOW
GLUCOSE UR QL STRIP: NORMAL
HGB UR QL STRIP: ABNORMAL
HYALINE CASTS #/AREA URNS LPF: ABNORMAL /LPF
KETONES UR QL STRIP: NEGATIVE
LEUKOCYTE ESTERASE UR QL STRIP: 500
MUCOUS THREADS URNS QL MICRO: ABNORMAL /LPF
N GONORRHOEA DNA SPEC QL NAA+PROBE: NOT DETECTED
N GONORRHOEA DNA SPEC QL NAA+PROBE: NOT DETECTED
NITRITE UR QL STRIP: NEGATIVE
PH UR STRIP: 6 [PH]
PROT UR QL STRIP: ABNORMAL
RBC #/AREA URNS AUTO: ABNORMAL /HPF
SP GR UR STRIP.AUTO: 1.02 (ref 1–1.03)
SPECIMEN SOURCE: NORMAL
SPECIMEN SOURCE: NORMAL
SQUAMOUS #/AREA URNS LPF: ABNORMAL /HPF
UROBILINOGEN UR STRIP-ACNC: NORMAL
WBC #/AREA URNS AUTO: >100 /HPF

## 2025-08-28 PROCEDURE — 99214 OFFICE O/P EST MOD 30 MIN: CPT | Mod: PBBFAC | Performed by: NURSE PRACTITIONER

## 2025-08-28 PROCEDURE — 81015 MICROSCOPIC EXAM OF URINE: CPT | Performed by: NURSE PRACTITIONER

## 2025-08-28 PROCEDURE — 87591 N.GONORRHOEAE DNA AMP PROB: CPT | Mod: 59 | Performed by: NURSE PRACTITIONER

## 2025-08-28 PROCEDURE — 87491 CHLMYD TRACH DNA AMP PROBE: CPT | Performed by: NURSE PRACTITIONER

## 2025-08-28 RX ORDER — BICTEGRAVIR SODIUM, EMTRICITABINE, AND TENOFOVIR ALAFENAMIDE FUMARATE 50; 200; 25 MG/1; MG/1; MG/1
1 TABLET ORAL DAILY
Qty: 30 TABLET | Refills: 3 | Status: SHIPPED | OUTPATIENT
Start: 2025-08-28

## 2025-08-29 ENCOUNTER — LAB VISIT (OUTPATIENT)
Dept: LAB | Facility: HOSPITAL | Age: 40
End: 2025-08-29
Attending: NURSE PRACTITIONER
Payer: MEDICAID

## 2025-08-29 DIAGNOSIS — E55.9 VITAMIN D DEFICIENCY: ICD-10-CM

## 2025-08-29 DIAGNOSIS — B18.2 CHRONIC HEPATITIS C WITHOUT HEPATIC COMA: ICD-10-CM

## 2025-08-29 DIAGNOSIS — B20 HIV DISEASE: ICD-10-CM

## 2025-08-29 LAB
25(OH)D3+25(OH)D2 SERPL-MCNC: 31 NG/ML (ref 30–80)
ALBUMIN SERPL-MCNC: 3.7 G/DL (ref 3.5–5)
ALBUMIN/GLOB SERPL: 0.8 RATIO (ref 1.1–2)
ALP SERPL-CCNC: 90 UNIT/L (ref 40–150)
ALT SERPL-CCNC: 34 UNIT/L (ref 0–55)
ANION GAP SERPL CALC-SCNC: 8 MEQ/L
AST SERPL-CCNC: 34 UNIT/L (ref 11–45)
BASOPHILS # BLD AUTO: 0.06 X10(3)/MCL
BASOPHILS NFR BLD AUTO: 1.3 %
BILIRUB SERPL-MCNC: 0.9 MG/DL
BUN SERPL-MCNC: 16.1 MG/DL (ref 8.9–20.6)
C TRACH RRNA SPEC QL NAA+PROBE: NORMAL
CALCIUM SERPL-MCNC: 9.8 MG/DL (ref 8.4–10.2)
CHLORIDE SERPL-SCNC: 103 MMOL/L (ref 98–107)
CHOLEST SERPL-MCNC: 107 MG/DL
CHOLEST/HDLC SERPL: 3 {RATIO} (ref 0–5)
CO2 SERPL-SCNC: 29 MMOL/L (ref 22–29)
CREAT SERPL-MCNC: 1.38 MG/DL (ref 0.72–1.25)
CREAT/UREA NIT SERPL: 12
EOSINOPHIL # BLD AUTO: 0.15 X10(3)/MCL (ref 0–0.9)
EOSINOPHIL NFR BLD AUTO: 3.3 %
ERYTHROCYTE [DISTWIDTH] IN BLOOD BY AUTOMATED COUNT: 13.5 % (ref 11.5–17)
EST. AVERAGE GLUCOSE BLD GHB EST-MCNC: 102.5 MG/DL
FERRITIN SERPL-MCNC: 110.92 NG/ML (ref 21.81–274.66)
GFR SERPLBLD CREATININE-BSD FMLA CKD-EPI: >60 ML/MIN/1.73/M2
GLOBULIN SER-MCNC: 4.5 GM/DL (ref 2.4–3.5)
GLUCOSE SERPL-MCNC: 83 MG/DL (ref 74–100)
HAV AB SER QL IA: REACTIVE
HBA1C MFR BLD: 5.2 %
HBV CORE AB SERPL QL IA: NONREACTIVE
HBV SURFACE AB SER-ACNC: 908.53 MIU/ML
HBV SURFACE AB SERPL IA-ACNC: REACTIVE M[IU]/ML
HBV SURFACE AG SERPL QL IA: NONREACTIVE
HCT VFR BLD AUTO: 39.7 % (ref 42–52)
HDLC SERPL-MCNC: 34 MG/DL (ref 35–60)
HGB BLD-MCNC: 13.3 G/DL (ref 14–18)
IMM GRANULOCYTES # BLD AUTO: 0.01 X10(3)/MCL (ref 0–0.04)
IMM GRANULOCYTES NFR BLD AUTO: 0.2 %
INR PPP: 1.1
LDLC SERPL CALC-MCNC: 16 MG/DL (ref 50–140)
LYMPHOCYTES # BLD AUTO: 1.54 X10(3)/MCL (ref 0.6–4.6)
LYMPHOCYTES NFR BLD AUTO: 33.4 %
MCH RBC QN AUTO: 30.3 PG (ref 27–31)
MCHC RBC AUTO-ENTMCNC: 33.5 G/DL (ref 33–36)
MCV RBC AUTO: 90.4 FL (ref 80–94)
MONOCYTES # BLD AUTO: 0.48 X10(3)/MCL (ref 0.1–1.3)
MONOCYTES NFR BLD AUTO: 10.4 %
N GONORRHOEA RRNA SPEC QL NAA+PROBE: NORMAL
NEUTROPHILS # BLD AUTO: 2.37 X10(3)/MCL (ref 2.1–9.2)
NEUTROPHILS NFR BLD AUTO: 51.4 %
NRBC BLD AUTO-RTO: 0 %
PLATELET # BLD AUTO: 283 X10(3)/MCL (ref 130–400)
PMV BLD AUTO: 9.6 FL (ref 7.4–10.4)
POTASSIUM SERPL-SCNC: 3.8 MMOL/L (ref 3.5–5.1)
PROT SERPL-MCNC: 8.2 GM/DL (ref 6.4–8.3)
PROTHROMBIN TIME: 13.5 SECONDS (ref 11.4–14)
RBC # BLD AUTO: 4.39 X10(6)/MCL (ref 4.7–6.1)
SODIUM SERPL-SCNC: 140 MMOL/L (ref 136–145)
T PALLIDUM AB SER QL: REACTIVE
T4 FREE SERPL-MCNC: 0.97 NG/DL (ref 0.7–1.48)
TRIGL SERPL-MCNC: 285 MG/DL (ref 34–140)
TSH SERPL-ACNC: 0.65 UIU/ML (ref 0.35–4.94)
VLDLC SERPL CALC-MCNC: 57 MG/DL
WBC # BLD AUTO: 4.61 X10(3)/MCL (ref 4.5–11.5)

## 2025-08-29 PROCEDURE — 83036 HEMOGLOBIN GLYCOSYLATED A1C: CPT

## 2025-08-29 PROCEDURE — 86039 ANTINUCLEAR ANTIBODIES (ANA): CPT

## 2025-08-29 PROCEDURE — 82306 VITAMIN D 25 HYDROXY: CPT

## 2025-08-29 PROCEDURE — 86592 SYPHILIS TEST NON-TREP QUAL: CPT

## 2025-08-29 PROCEDURE — 87536 HIV-1 QUANT&REVRSE TRNSCRPJ: CPT

## 2025-08-29 PROCEDURE — 80053 COMPREHEN METABOLIC PANEL: CPT

## 2025-08-29 PROCEDURE — 84443 ASSAY THYROID STIM HORMONE: CPT

## 2025-08-29 PROCEDURE — 82728 ASSAY OF FERRITIN: CPT

## 2025-08-29 PROCEDURE — 86704 HEP B CORE ANTIBODY TOTAL: CPT

## 2025-08-29 PROCEDURE — 80061 LIPID PANEL: CPT

## 2025-08-29 PROCEDURE — 87902 NFCT AGT GNTYP ALYS HEP C: CPT | Mod: 59

## 2025-08-29 PROCEDURE — 86361 T CELL ABSOLUTE COUNT: CPT

## 2025-08-29 PROCEDURE — 36415 COLL VENOUS BLD VENIPUNCTURE: CPT

## 2025-08-29 PROCEDURE — 85610 PROTHROMBIN TIME: CPT

## 2025-08-29 PROCEDURE — 86480 TB TEST CELL IMMUN MEASURE: CPT

## 2025-08-29 PROCEDURE — 86708 HEPATITIS A ANTIBODY: CPT

## 2025-08-29 PROCEDURE — 87902 NFCT AGT GNTYP ALYS HEP C: CPT

## 2025-08-29 PROCEDURE — 85025 COMPLETE CBC W/AUTO DIFF WBC: CPT

## 2025-08-29 PROCEDURE — 86780 TREPONEMA PALLIDUM: CPT

## 2025-08-29 PROCEDURE — 87340 HEPATITIS B SURFACE AG IA: CPT

## 2025-08-29 PROCEDURE — 86706 HEP B SURFACE ANTIBODY: CPT

## 2025-08-29 PROCEDURE — 87522 HEPATITIS C REVRS TRNSCRPJ: CPT

## 2025-08-29 PROCEDURE — 81596 NFCT DS CHRNC HCV 6 ASSAYS: CPT

## 2025-08-29 PROCEDURE — 84439 ASSAY OF FREE THYROXINE: CPT

## 2025-08-31 LAB
RPR SER QL: REACTIVE
RPR SER-TITR: ABNORMAL {TITER}

## 2025-09-02 LAB
AGE: 39
CD3+CD4+ CELLS # SPEC: 479 UNIT/L (ref 589–1505)
CD3+CD4+ CELLS NFR BLD: 31.5 %
HIV1 RNA # SERPL NAA+PROBE: 94 COPIES/ML
HIV1 RNA SERPL NAA+PROBE-LOG#: DETECTED {LOG_COPIES}/ML
LYMPHOCYTES # BLD AUTO: 1521.3 X10(3)/MCL (ref 1260–5520)
LYMPHOCYTES NFR LN MANUAL: 33 % (ref 28–48)
LYMPHOMA - T-CELL MARKERS SPEC-IMP: ABNORMAL
WBC # BLD AUTO: 4610 /MM3 (ref 4500–11500)

## 2025-09-03 LAB
ANA SER QL HEP2 SUBST: NORMAL
GAMMA INTERFERON BACKGROUND BLD IA-ACNC: 0.02 IU/ML
HCV RNA SERPL NAA+PROBE-ACNC: ABNORMAL IU/ML
HCV RNA SERPL NAA+PROBE-LOG IU: DETECTED {LOG_IU}/ML
M TB IFN-G BLD-IMP: NEGATIVE
M TB IFN-G CD4+ BCKGRND COR BLD-ACNC: 0 IU/ML
M TB IFN-G CD4+CD8+ BCKGRND COR BLD-ACNC: 0 IU/ML
MITOGEN IGNF BCKGRD COR BLD-ACNC: 9.98 IU/ML

## 2025-09-04 LAB
A2 MACROGLOB SERPL-MCNC: 213 MG/DL (ref 100–280)
ALT SERPL W P-5'-P-CCNC: 37 U/L (ref 7–55)
ANNOTATION COMMENT IMP: ABNORMAL
APO A-I SERPL-MCNC: 104 MG/DL
BILIRUB SERPL-MCNC: 0.8 MG/DL (ref 0–1.2)
FIBROSIS STAGE SERPL QL: ABNORMAL
GGT SERPL-CCNC: 15 U/L (ref 8–61)
HAPTOGLOB SERPL NEPH-MCNC: 172 MG/DL (ref 30–200)
HCV GENTYP SERPL NAA+PROBE: 3
LIVER FIBR SCORE SERPL CALC.FIBROSURE: 0.26
LIVER FIBROSIS INTERPRETATION SER-IMP: ABNORMAL
NECROINFLAMMATORY ACT GRADE SERPL QL: ABNORMAL
NECROINFLAMMATORY ACT SCORE SERPL: 0.19
NECROINFLAMMATORY ACTIV INTERP SER-IMP: ABNORMAL
SERIAL #: ABNORMAL